# Patient Record
Sex: MALE | Race: WHITE | Employment: FULL TIME | URBAN - NONMETROPOLITAN AREA
[De-identification: names, ages, dates, MRNs, and addresses within clinical notes are randomized per-mention and may not be internally consistent; named-entity substitution may affect disease eponyms.]

---

## 2019-04-15 ENCOUNTER — HOSPITAL ENCOUNTER (EMERGENCY)
Dept: GENERAL RADIOLOGY | Age: 24
Discharge: HOME | End: 2019-04-15
Payer: COMMERCIAL

## 2019-04-15 ENCOUNTER — HOSPITAL ENCOUNTER (EMERGENCY)
Age: 24
Discharge: HOME | End: 2019-04-15
Payer: COMMERCIAL

## 2019-04-15 VITALS
OXYGEN SATURATION: 98 % | SYSTOLIC BLOOD PRESSURE: 138 MMHG | TEMPERATURE: 98.3 F | BODY MASS INDEX: 25.07 KG/M2 | HEART RATE: 72 BPM | DIASTOLIC BLOOD PRESSURE: 83 MMHG | RESPIRATION RATE: 16 BRPM | WEIGHT: 190 LBS

## 2019-04-15 DIAGNOSIS — S52.101A CLOSED FRACTURE OF PROXIMAL END OF RIGHT RADIUS, UNSPECIFIED FRACTURE MORPHOLOGY, INITIAL ENCOUNTER: Primary | ICD-10-CM

## 2019-04-15 PROCEDURE — 73080 X-RAY EXAM OF ELBOW: CPT

## 2019-04-15 PROCEDURE — 99202 OFFICE O/P NEW SF 15 MIN: CPT | Performed by: NURSE PRACTITIONER

## 2019-04-15 PROCEDURE — 29125 APPL SHORT ARM SPLINT STATIC: CPT

## 2019-04-15 PROCEDURE — 99214 OFFICE O/P EST MOD 30 MIN: CPT

## 2019-04-15 PROCEDURE — 2709999900 HC NON-CHARGEABLE SUPPLY

## 2019-04-15 ASSESSMENT — ENCOUNTER SYMPTOMS
VOMITING: 0
NAUSEA: 0
BACK PAIN: 0
COLOR CHANGE: 1
SHORTNESS OF BREATH: 0
DIARRHEA: 0

## 2019-04-15 ASSESSMENT — PAIN SCALES - GENERAL: PAINLEVEL_OUTOF10: 3

## 2019-04-15 ASSESSMENT — PAIN DESCRIPTION - PAIN TYPE: TYPE: ACUTE PAIN

## 2019-04-15 ASSESSMENT — PAIN DESCRIPTION - ORIENTATION: ORIENTATION: RIGHT

## 2019-04-15 ASSESSMENT — PAIN DESCRIPTION - LOCATION: LOCATION: ELBOW

## 2019-04-15 ASSESSMENT — PAIN DESCRIPTION - FREQUENCY: FREQUENCY: INTERMITTENT

## 2019-04-15 ASSESSMENT — PAIN - FUNCTIONAL ASSESSMENT: PAIN_FUNCTIONAL_ASSESSMENT: PREVENTS OR INTERFERES WITH MANY ACTIVE NOT PASSIVE ACTIVITIES

## 2019-04-15 NOTE — ED PROVIDER NOTES
[facility] 36  Urgent Care Encounter       CHIEF COMPLAINT       Chief Complaint   Patient presents with    Joint Swelling     right elbow injury       Nurses Notes reviewed and I agree except as noted in the HPI. HISTORY OF PRESENT ILLNESS   Nadeen Qureshi is a 21 y.o. male who presents to the urgent care for complaints of right elbow swelling. The swelling occurred after injury. This injury occurred on Saturday. The patient was running and fell. When he was falling he struck his arms and hands out to catch himself. Since this time he has been experiencing right elbow pain. He states that Saturday he had severe decreased range of motion in that keep his arm band and a 90° position to provide relief of pain. He denies any numbness or tingling in the affected extremity. HPI    REVIEW OF SYSTEMS     Review of Systems   Constitutional: Positive for activity change. Negative for chills and fever. Respiratory: Negative for shortness of breath. Cardiovascular: Negative for chest pain. Gastrointestinal: Negative for diarrhea, nausea and vomiting. Musculoskeletal: Positive for arthralgias (Right elbow) and joint swelling (Right elbow). Negative for back pain, gait problem and myalgias. Skin: Positive for color change (Right elbow bruising.). Negative for pallor and rash. Allergic/Immunologic: Negative for food allergies. Neurological: Negative for dizziness and headaches. PAST MEDICAL HISTORY         Diagnosis Date    Nicotine dependence        SURGICALHISTORY     Patient  has no past surgical history on file. CURRENT MEDICATIONS       Current Discharge Medication List      CONTINUE these medications which have NOT CHANGED    Details   Ibuprofen (ADVIL PO) Take 2 tablets by mouth      Blood Pressure KIT Check BP's once daily.   Qty: 1 kit, Refills: 0    Associated Diagnoses: Single episode of elevated blood pressure             ALLERGIES     Patient is has No Known recognition program. Efforts were made to edit the dictations but occasionally words are mis-transcribed.)            Martine Veronica, DALTON - CNP  04/15/19 5738

## 2019-04-15 NOTE — ED TRIAGE NOTES
3 days ago outside running on concrete tripped fell face first, right elbow pain, swelling. advil taken. Iced.

## 2020-01-07 ENCOUNTER — HOSPITAL ENCOUNTER (EMERGENCY)
Age: 25
Discharge: HOME | End: 2020-01-07
Payer: COMMERCIAL

## 2020-01-07 VITALS
DIASTOLIC BLOOD PRESSURE: 84 MMHG | HEART RATE: 70 BPM | RESPIRATION RATE: 18 BRPM | WEIGHT: 190 LBS | HEIGHT: 73 IN | SYSTOLIC BLOOD PRESSURE: 152 MMHG | TEMPERATURE: 97.9 F | BODY MASS INDEX: 25.18 KG/M2 | OXYGEN SATURATION: 98 %

## 2020-01-07 PROCEDURE — 90471 IMMUNIZATION ADMIN: CPT | Performed by: NURSE PRACTITIONER

## 2020-01-07 PROCEDURE — 6370000000 HC RX 637 (ALT 250 FOR IP): Performed by: NURSE PRACTITIONER

## 2020-01-07 PROCEDURE — 99282 EMERGENCY DEPT VISIT SF MDM: CPT

## 2020-01-07 PROCEDURE — 90715 TDAP VACCINE 7 YRS/> IM: CPT | Performed by: NURSE PRACTITIONER

## 2020-01-07 PROCEDURE — 6360000002 HC RX W HCPCS: Performed by: NURSE PRACTITIONER

## 2020-01-07 RX ORDER — ERYTHROMYCIN 5 MG/G
OINTMENT OPHTHALMIC
Qty: 1 TUBE | Refills: 0 | Status: SHIPPED | OUTPATIENT
Start: 2020-01-07 | End: 2021-09-20

## 2020-01-07 RX ORDER — ERYTHROMYCIN 5 MG/G
OINTMENT OPHTHALMIC ONCE
Status: COMPLETED | OUTPATIENT
Start: 2020-01-07 | End: 2020-01-07

## 2020-01-07 RX ADMIN — TETANUS TOXOID, REDUCED DIPHTHERIA TOXOID AND ACELLULAR PERTUSSIS VACCINE, ADSORBED 0.5 ML: 5; 2.5; 8; 8; 2.5 SUSPENSION INTRAMUSCULAR at 23:03

## 2020-01-07 RX ADMIN — ERYTHROMYCIN: 5 OINTMENT OPHTHALMIC at 23:02

## 2020-01-07 ASSESSMENT — PAIN DESCRIPTION - FREQUENCY: FREQUENCY: INTERMITTENT

## 2020-01-07 ASSESSMENT — ENCOUNTER SYMPTOMS
SORE THROAT: 0
TROUBLE SWALLOWING: 0
NAUSEA: 0
CHEST TIGHTNESS: 0
ABDOMINAL PAIN: 0
SHORTNESS OF BREATH: 0
RHINORRHEA: 0
WHEEZING: 0
EYE PAIN: 1
VOMITING: 0
COUGH: 0

## 2020-01-07 ASSESSMENT — PAIN DESCRIPTION - PAIN TYPE: TYPE: ACUTE PAIN

## 2020-01-07 ASSESSMENT — PAIN DESCRIPTION - ORIENTATION: ORIENTATION: LEFT

## 2020-01-07 ASSESSMENT — PAIN DESCRIPTION - ONSET: ONSET: SUDDEN

## 2020-01-07 ASSESSMENT — PAIN DESCRIPTION - LOCATION: LOCATION: EYE

## 2020-01-07 ASSESSMENT — PAIN SCALES - GENERAL: PAINLEVEL_OUTOF10: 6

## 2020-01-07 ASSESSMENT — PAIN DESCRIPTION - DESCRIPTORS: DESCRIPTORS: SHARP

## 2020-01-08 NOTE — ED PROVIDER NOTES
[facility]  eMERGENCY dEPARTMENT eNCOUnter          CHIEF COMPLAINT       Chief Complaint   Patient presents with    Foreign Body     left eye       Nurses Notes reviewed and I agree except as noted in the HPI. HISTORY OF PRESENT ILLNESS    Torsten Calderon is a 25 y.o. male who presents to the Emergency Department for the evaluation of foreign body in left eye. The patient stated he was outside smoking a cigarette during his lunch break and reported that he felt something fly into his left eye around 6 PM. The patient stated he applied eye drops in his eye, and remained at work. However, the patient stated the eye still felt irritated even after the administration of eye drops. Per the nurse's note, the nurse at work advised the patient to be visit the ED due to observing something on the middle of the left eye. The patient rated his eye pain a 6 out of 10 in severity. The patient has medical history of nicotine dependence. The patient has no further acute complaints at this time. The HPI was provided by the patient. REVIEW OF SYSTEMS     Review of Systems   Constitutional: Negative for fever. HENT: Negative for congestion, rhinorrhea, sore throat and trouble swallowing. Eyes: Positive for pain (left eye). Respiratory: Negative for cough, chest tightness, shortness of breath and wheezing. Cardiovascular: Negative for chest pain and palpitations. Gastrointestinal: Negative for abdominal pain, nausea and vomiting. Musculoskeletal: Negative for arthralgias and myalgias. Neurological: Negative for headaches. Hematological: Does not bruise/bleed easily. PAST MEDICAL HISTORY    has a past medical history of Nicotine dependence. SURGICAL HISTORY      has no past surgical history on file. CURRENT MEDICATIONS       Previous Medications    BLOOD PRESSURE KIT    Check BP's once daily.     IBUPROFEN (ADVIL PO)    Take 2 tablets by mouth       ALLERGIES     has No Known Allergies. FAMILY HISTORY     He indicated that his mother is alive. He indicated that his father is alive. He indicated that his paternal grandfather is . He indicated that the status of his neg hx is unknown.   family history includes Cancer in his paternal grandfather. SOCIAL HISTORY      reports that he has been smoking cigarettes. He has a 1.00 pack-year smoking history. He has never used smokeless tobacco. He reports current alcohol use. He reports that he does not use drugs. PHYSICAL EXAM     INITIAL VITALS:  height is 6' 1\" (1.854 m) and weight is 190 lb (86.2 kg). His temperature is 97.9 °F (36.6 °C). His blood pressure is 152/84 (abnormal) and his pulse is 70. His respiration is 18 and oxygen saturation is 98%. Physical Exam  Vitals signs and nursing note reviewed. Constitutional:       Appearance: He is well-developed. He is not diaphoretic. HENT:      Head: Normocephalic and atraumatic. Right Ear: External ear normal.      Left Ear: External ear normal.   Eyes:      General: No scleral icterus. Right eye: No discharge. Left eye: No foreign body or discharge. Conjunctiva/sclera: Conjunctivae normal.      Pupils:      Left eye: Corneal abrasion and fluorescein uptake present. Slit lamp exam:     Left eye: No foreign body. Comments: Corneal abrasion is at the 6 o'clock position. IOP 16. Neck:      Musculoskeletal: Normal range of motion and neck supple. Vascular: No JVD. Pulmonary:      Effort: Pulmonary effort is normal. No respiratory distress. Breath sounds: No stridor. Abdominal:      General: There is no distension. Palpations: Abdomen is soft. Musculoskeletal: Normal range of motion. Skin:     General: Skin is warm and dry. Findings: No erythema. Neurological:      Mental Status: He is alert and oriented to person, place, and time. Motor: No abnormal muscle tone.    Psychiatric:         Behavior: Behavior normal.          DIFFERENTIAL DIAGNOSIS:   Including but not limited to: foreign body, corneal abrasion, foreign body sensation     DIAGNOSTIC RESULTS       RADIOLOGY: non-plainfilm images(s) such as CT, Ultrasound and MRI are read by the radiologist.    No orders to display       LABS:     Labs Reviewed - No data to display    EMERGENCY DEPARTMENT COURSE:   Vitals:    Vitals:    01/07/20 2224   BP: (!) 152/84   Pulse: 70   Resp: 18   Temp: 97.9 °F (36.6 °C)   SpO2: 98%   Weight: 190 lb (86.2 kg)   Height: 6' 1\" (1.854 m)       10:41 PM: The patient was seen and evaluated. MDM:  Pertinent Labs & Imaging studies reviewed. (See chart for details)    The patient was seen and evaluated within the ED today with foreign body in the left eye. Within the department, I observed the patient's vital signs to be within acceptable range. On exam, I appreciated findings as documented in the physical exam.  Radiological studies and laboratory work were not indicated. Within the department, the patient was treated with Boostrix to update his tetanus status. I observed the patient's condition to remain stable during the duration of the stay and I explained my proposed course of treatment to the patient, who was amenable to my decision. They were discharged home in stable condition with a prescription for erythromycin ointment he is instructed to follow-up tomorrow with occupational health and ophthalmology, and the patient will return to the ED if the symptoms become more severe in nature or otherwise change    I have given the patient strict written and verbal instructions about care at home, follow-up, and signs and symptoms of worsening of condition and they did verbalize understanding. CRITICAL CARE:   none    CONSULTS:  none    PROCEDURES:  none    FINAL IMPRESSION      1.  Abrasion of left cornea, initial encounter          DISPOSITION/PLAN   Discharge      PATIENT REFERRED TO:  Gertrudis Lew MD  [address] [facility] [address]  [phone]    Schedule an appointment as soon as possible for a visit in 1 day      [address] [place] [address]  [phone]  Go in 1 day  10:30      DISCHARGE MEDICATIONS:  New Prescriptions    ERYTHROMYCIN (ROMYCIN) 5 MG/GM OPHTHALMIC OINTMENT    Every 4 hours while awake for 7 days       (Please note that portions of this note were completed with a voice recognition program.  Efforts were made to edit the dictations but occasionally words are mis-transcribed.)    The patient was given an opportunity to see the Emergency Attending. The patient voiced understanding that I was a Mid-LevelProvider and was in agreement with being seen independently by myself. Scribe:  Deepali Rivera 1/7/20 10:41 PM Scribing for and in the presence of Bassem Cline CNP. Signed by: Ilana Fields, 01/07/20 11:17 PM    Provider:  I personally performed the services described in the documentation, reviewed and edited the documentation which was dictated to the scribe in my presence, and it accurately records my words and actions.     Bassem Cline CNP 1/7/20 11:17 PM      DALTON Moreno CNP  01/07/20 6055

## 2021-09-20 ENCOUNTER — HOSPITAL ENCOUNTER (EMERGENCY)
Age: 26
Discharge: HOME | End: 2021-09-20
Payer: COMMERCIAL

## 2021-09-20 VITALS
DIASTOLIC BLOOD PRESSURE: 85 MMHG | HEART RATE: 82 BPM | HEIGHT: 74 IN | TEMPERATURE: 97.6 F | OXYGEN SATURATION: 97 % | WEIGHT: 185 LBS | RESPIRATION RATE: 16 BRPM | BODY MASS INDEX: 23.74 KG/M2 | SYSTOLIC BLOOD PRESSURE: 142 MMHG

## 2021-09-20 DIAGNOSIS — Z20.822 SUSPECTED 2019 NOVEL CORONAVIRUS INFECTION: Primary | ICD-10-CM

## 2021-09-20 PROCEDURE — 99214 OFFICE O/P EST MOD 30 MIN: CPT | Performed by: NURSE PRACTITIONER

## 2021-09-20 PROCEDURE — 99213 OFFICE O/P EST LOW 20 MIN: CPT

## 2021-09-20 PROCEDURE — 87636 SARSCOV2 & INF A&B AMP PRB: CPT

## 2021-09-20 ASSESSMENT — ENCOUNTER SYMPTOMS
BACK PAIN: 0
DIARRHEA: 0
EYE REDNESS: 0
RHINORRHEA: 0
CONSTIPATION: 0
WHEEZING: 0
COUGH: 1
NAUSEA: 0
SORE THROAT: 0
TROUBLE SWALLOWING: 0
VOMITING: 0
SHORTNESS OF BREATH: 0
EYE PAIN: 0
EYE DISCHARGE: 0
ALLERGIC/IMMUNOLOGIC NEGATIVE: 1
ABDOMINAL PAIN: 0

## 2021-09-20 ASSESSMENT — PAIN DESCRIPTION - LOCATION: LOCATION: EAR

## 2021-09-20 ASSESSMENT — PAIN SCALES - GENERAL: PAINLEVEL_OUTOF10: 7

## 2021-09-20 ASSESSMENT — PAIN DESCRIPTION - ORIENTATION: ORIENTATION: RIGHT;LEFT

## 2021-09-20 ASSESSMENT — PAIN DESCRIPTION - PAIN TYPE: TYPE: ACUTE PAIN

## 2021-09-20 ASSESSMENT — PAIN DESCRIPTION - DESCRIPTORS: DESCRIPTORS: PRESSURE

## 2021-09-20 NOTE — ED PROVIDER NOTES
[facility] 36  Urgent Care Encounter      CHIEF COMPLAINT       Chief Complaint   Patient presents with    Chills    Cough       Nurses Notes reviewed and I agree except as noted in the HPI. HISTORY OF PRESENT ILLNESS   Chauncey Almazan is a 32 y.o. male who presents with development of chills, cough, congestion, runny nose and fatigue for the last couple of days. He has had here by his employer today for COVID-19 testing. Denies nausea vomiting but does have a bit of diarrhea, no abdominal pain or severe headache. Non-smoker with no history of asthma. Patient specifically requests PCR testing. REVIEW OF SYSTEMS     Review of Systems   Constitutional: Positive for chills and fatigue. Negative for activity change and fever. HENT: Positive for congestion. Negative for ear pain, rhinorrhea, sore throat and trouble swallowing. Eyes: Negative for pain, discharge and redness. Respiratory: Positive for cough. Negative for shortness of breath and wheezing. Cardiovascular: Negative. Gastrointestinal: Negative for abdominal pain, constipation, diarrhea, nausea and vomiting. Endocrine: Negative. Genitourinary: Negative for dysuria, frequency and urgency. Musculoskeletal: Negative for arthralgias, back pain and myalgias. Skin: Negative for rash. Allergic/Immunologic: Negative. Neurological: Negative for dizziness, tremors, weakness and headaches. Hematological: Negative. Psychiatric/Behavioral: Negative for dysphoric mood and sleep disturbance. The patient is not nervous/anxious. PAST MEDICAL HISTORY         Diagnosis Date    Nicotine dependence        SURGICAL HISTORY     Patient  has no past surgical history on file. CURRENT MEDICATIONS       Previous Medications    BLOOD PRESSURE KIT    Check BP's once daily. IBUPROFEN (ADVIL PO)    Take 2 tablets by mouth       ALLERGIES     Patient is has No Known Allergies.     FAMILY HISTORY     Patient'sfamily history includes Cancer in his paternal grandfather. SOCIAL HISTORY     Patient  reports that he has been smoking cigarettes. He has a 1.00 pack-year smoking history. He has never used smokeless tobacco. He reports current alcohol use. He reports that he does not use drugs. PHYSICAL EXAM     ED TRIAGE VITALS  BP: (!) 142/85, Temp: 97.6 °F (36.4 °C), Pulse: 82, Resp: 16, SpO2: 97 %  Physical Exam  Constitutional:       General: He is not in acute distress. Appearance: He is well-developed. He is ill-appearing. He is not diaphoretic. HENT:      Right Ear: External ear normal.      Left Ear: External ear normal.      Nose: Congestion and rhinorrhea present. Mouth/Throat:      Mouth: Mucous membranes are moist.      Pharynx: Posterior oropharyngeal erythema present. Eyes:      General:         Right eye: No discharge. Left eye: No discharge. Conjunctiva/sclera: Conjunctivae normal.      Pupils: Pupils are equal, round, and reactive to light. Neck:      Vascular: No JVD. Cardiovascular:      Rate and Rhythm: Normal rate and regular rhythm. Heart sounds: Normal heart sounds. No murmur heard. Pulmonary:      Effort: Pulmonary effort is normal. No respiratory distress. Breath sounds: Normal breath sounds. Musculoskeletal:         General: No tenderness or deformity. Normal range of motion. Cervical back: Normal range of motion. Skin:     General: Skin is warm and dry. Capillary Refill: Capillary refill takes less than 2 seconds. Coloration: Skin is not pale. Findings: No erythema or rash. Neurological:      Mental Status: He is alert and oriented to person, place, and time. Coordination: Coordination normal.   Psychiatric:         Behavior: Behavior normal.         Thought Content: Thought content normal.         Judgment: Judgment normal.         DIAGNOSTIC RESULTS   Labs: No results found for this visit on 09/20/21.     IMAGING:    URGENT CARE COURSE:     Vitals:    09/20/21 1619   BP: (!) 142/85   Pulse: 82   Resp: 16   Temp: 97.6 °F (36.4 °C)   SpO2: 97%   Weight: 185 lb (83.9 kg)   Height: 6' 2\" (1.88 m)       Medications - No data to display  PROCEDURES:  None  FINAL IMPRESSION      1. Suspected 2019 novel coronavirus infection        DISPOSITION/PLAN   DISPOSITION Decision To Discharge 09/20/2021 04:33:35 PM    COVID-19 test pending.     PATIENT REFERRED TO:  DALTON Bruner CNP  [address]  [phone]      As needed    DISCHARGE MEDICATIONS:  New Prescriptions    No medications on file     Current Discharge Medication List          DALTON Khan CNP, APRN - CNP  09/20/21 1114

## 2021-09-21 ENCOUNTER — CARE COORDINATION (OUTPATIENT)
Dept: CARE COORDINATION | Age: 26
End: 2021-09-21

## 2021-09-21 LAB
INFLUENZA A: NOT DETECTED
INFLUENZA B: NOT DETECTED
SARS-COV-2 RNA, RT PCR: DETECTED

## 2021-09-21 NOTE — CARE COORDINATION
Patient contacted regarding recent visit for viral symptoms. Call within 2 business days of discharge: Yes     contacted the patient by telephone to perform follow-up call. Verified name and  with patient as identifiers. Provided introduction to self, and reason for call due to viral symptoms of infection and/or exposure to COVID-19. Discussed COVID-19 related testing which was available at this time. Test results were positive. Patient informed of results, if available? Yes. Patient presented to emergency department/flu clinic with complaints of viral symptoms/exposure to COVID. Patient reports symptoms are improving. Due to no new or worsening symptoms the RN CTN/ACJENNYFER was not notified for escalation. This author reviewed discharge instructions, medical action plan and red flags such as increased shortness of breath, increasing fever, worsening cough or chest pain with patient who verbalized understanding. Discussed exposure protocols and quarantine with CDC Guidelines What To Do If You Are Sick    Patient was given an opportunity for questions and concerns. The patient agrees to contact their healthcare provider for questions related to their healthcare. Author provided contact information for future reference. COVID positive. Patient states he is feeling better. States his ear feels clogged. Planning to try OTC ear drops. Mother did bring patients vitamins and zinc.  Denies fever or SOB. Encouraged rest, fluids and isolation. Patient was encouraged to f/u with local health department and contact information was provided. Covid-19 education was reviewed with patient, and patient verbalized understanding. Patient was reminded to call covid-19 hotline number with any new symptoms or questions/concerns. Patient verbalized understanding and hotline number provided. Patient denied any other questions, concerns, or needs.

## 2022-02-21 ENCOUNTER — HOSPITAL ENCOUNTER (EMERGENCY)
Age: 27
Discharge: HOME | End: 2022-02-21
Attending: FAMILY MEDICINE
Payer: COMMERCIAL

## 2022-02-21 ENCOUNTER — TELEPHONE (OUTPATIENT)
Dept: UROLOGY | Age: 27
End: 2022-02-21

## 2022-02-21 ENCOUNTER — APPOINTMENT (OUTPATIENT)
Dept: ULTRASOUND IMAGING | Age: 27
End: 2022-02-21
Payer: COMMERCIAL

## 2022-02-21 ENCOUNTER — APPOINTMENT (OUTPATIENT)
Dept: CT IMAGING | Age: 27
End: 2022-02-21
Payer: COMMERCIAL

## 2022-02-21 VITALS
TEMPERATURE: 98.2 F | HEART RATE: 99 BPM | BODY MASS INDEX: 26.95 KG/M2 | DIASTOLIC BLOOD PRESSURE: 90 MMHG | SYSTOLIC BLOOD PRESSURE: 171 MMHG | OXYGEN SATURATION: 96 % | RESPIRATION RATE: 16 BRPM | HEIGHT: 74 IN | WEIGHT: 210 LBS

## 2022-02-21 DIAGNOSIS — N50.89 TESTICULAR MASS: Primary | ICD-10-CM

## 2022-02-21 LAB
ALBUMIN SERPL-MCNC: 4.7 G/DL (ref 3.5–5.1)
ALP BLD-CCNC: 119 U/L (ref 38–126)
ALT SERPL-CCNC: 20 U/L (ref 11–66)
ANION GAP SERPL CALCULATED.3IONS-SCNC: 13 MEQ/L (ref 8–16)
AST SERPL-CCNC: 19 U/L (ref 5–40)
BASOPHILS # BLD: 0.3 %
BASOPHILS ABSOLUTE: 0 THOU/MM3 (ref 0–0.1)
BILIRUB SERPL-MCNC: 0.5 MG/DL (ref 0.3–1.2)
BILIRUBIN URINE: NEGATIVE
BLOOD, URINE: NEGATIVE
BUN BLDV-MCNC: 10 MG/DL (ref 7–22)
CALCIUM SERPL-MCNC: 9.6 MG/DL (ref 8.5–10.5)
CHARACTER, URINE: CLEAR
CHLAMYDIA TRACHOMATIS BY RT-PCR: NOT DETECTED
CHLORIDE BLD-SCNC: 102 MEQ/L (ref 98–111)
CO2: 23 MEQ/L (ref 23–33)
COLOR: YELLOW
CREAT SERPL-MCNC: 0.8 MG/DL (ref 0.4–1.2)
CT/NG SOURCE: NORMAL
EOSINOPHIL # BLD: 0.2 %
EOSINOPHILS ABSOLUTE: 0 THOU/MM3 (ref 0–0.4)
ERYTHROCYTE [DISTWIDTH] IN BLOOD BY AUTOMATED COUNT: 11.7 % (ref 11.5–14.5)
ERYTHROCYTE [DISTWIDTH] IN BLOOD BY AUTOMATED COUNT: 39.6 FL (ref 35–45)
GFR SERPL CREATININE-BSD FRML MDRD: > 90 ML/MIN/1.73M2
GLUCOSE BLD-MCNC: 90 MG/DL (ref 70–108)
GLUCOSE URINE: NEGATIVE MG/DL
HCT VFR BLD CALC: 49.3 % (ref 42–52)
HEMOGLOBIN: 16.7 GM/DL (ref 14–18)
IMMATURE GRANS (ABS): 0.03 THOU/MM3 (ref 0–0.07)
IMMATURE GRANULOCYTES: 0.3 %
KETONES, URINE: ABNORMAL
LD: 445 U/L (ref 100–190)
LEUKOCYTE ESTERASE, URINE: NEGATIVE
LYMPHOCYTES # BLD: 17.6 %
LYMPHOCYTES ABSOLUTE: 1.7 THOU/MM3 (ref 1–4.8)
MCH RBC QN AUTO: 31.3 PG (ref 26–33)
MCHC RBC AUTO-ENTMCNC: 33.9 GM/DL (ref 32.2–35.5)
MCV RBC AUTO: 92.5 FL (ref 80–94)
MONOCYTES # BLD: 8 %
MONOCYTES ABSOLUTE: 0.8 THOU/MM3 (ref 0.4–1.3)
NEISSERIA GONORRHOEAE BY RT-PCR: NOT DETECTED
NITRITE, URINE: NEGATIVE
NUCLEATED RED BLOOD CELLS: 0 /100 WBC
OSMOLALITY CALCULATION: 274.3 MOSMOL/KG (ref 275–300)
PH UA: 5 (ref 5–9)
PLATELET # BLD: 197 THOU/MM3 (ref 130–400)
PMV BLD AUTO: 10.8 FL (ref 9.4–12.4)
POTASSIUM REFLEX MAGNESIUM: 4.5 MEQ/L (ref 3.5–5.2)
PREGNANCY, SERUM: NEGATIVE
PROTEIN UA: NEGATIVE
RBC # BLD: 5.33 MILL/MM3 (ref 4.7–6.1)
SEG NEUTROPHILS: 73.6 %
SEGMENTED NEUTROPHILS ABSOLUTE COUNT: 7.1 THOU/MM3 (ref 1.8–7.7)
SODIUM BLD-SCNC: 138 MEQ/L (ref 135–145)
SPECIFIC GRAVITY, URINE: 1.03 (ref 1–1.03)
TOTAL PROTEIN: 7.6 G/DL (ref 6.1–8)
UROBILINOGEN, URINE: 0.2 EU/DL (ref 0–1)
WBC # BLD: 9.6 THOU/MM3 (ref 4.8–10.8)

## 2022-02-21 PROCEDURE — 76870 US EXAM SCROTUM: CPT

## 2022-02-21 PROCEDURE — 99282 EMERGENCY DEPT VISIT SF MDM: CPT

## 2022-02-21 PROCEDURE — 87591 N.GONORRHOEAE DNA AMP PROB: CPT

## 2022-02-21 PROCEDURE — 80053 COMPREHEN METABOLIC PANEL: CPT

## 2022-02-21 PROCEDURE — 84703 CHORIONIC GONADOTROPIN ASSAY: CPT

## 2022-02-21 PROCEDURE — 36415 COLL VENOUS BLD VENIPUNCTURE: CPT

## 2022-02-21 PROCEDURE — 74177 CT ABD & PELVIS W/CONTRAST: CPT

## 2022-02-21 PROCEDURE — 83615 LACTATE (LD) (LDH) ENZYME: CPT

## 2022-02-21 PROCEDURE — 81003 URINALYSIS AUTO W/O SCOPE: CPT

## 2022-02-21 PROCEDURE — 87491 CHLMYD TRACH DNA AMP PROBE: CPT

## 2022-02-21 PROCEDURE — 85025 COMPLETE CBC W/AUTO DIFF WBC: CPT

## 2022-02-21 PROCEDURE — 6360000004 HC RX CONTRAST MEDICATION: Performed by: UROLOGY

## 2022-02-21 PROCEDURE — 82105 ALPHA-FETOPROTEIN SERUM: CPT

## 2022-02-21 PROCEDURE — 99282 EMERGENCY DEPT VISIT SF MDM: CPT | Performed by: UROLOGY

## 2022-02-21 RX ADMIN — IOPAMIDOL 80 ML: 755 INJECTION, SOLUTION INTRAVENOUS at 16:13

## 2022-02-21 ASSESSMENT — ENCOUNTER SYMPTOMS
EYE DISCHARGE: 0
FACIAL SWELLING: 0
SINUS PRESSURE: 0
EYE REDNESS: 0
BLOOD IN STOOL: 0
PHOTOPHOBIA: 0
ABDOMINAL PAIN: 0
CHOKING: 0
RECTAL PAIN: 0
SHORTNESS OF BREATH: 0
ANAL BLEEDING: 0
CHEST TIGHTNESS: 0
COUGH: 0
APNEA: 0
SINUS PAIN: 0
EYE PAIN: 0

## 2022-02-21 NOTE — ED TRIAGE NOTES
Pt presents to ED with R testicle swelling and pain. Pt states that the swelling started about 2 weeks ago and has gradually gotten worse over time. Pt states that he was unable to move well due to pain on Friday, 2/18. Pt states there was no known injury to the area. Pt states no pain when sitting but pain appears while moving. Pts vitals were taken and are stable, BP was 171/90.

## 2022-02-21 NOTE — ED PROVIDER NOTES
[facility] ENCOUNTER          Pt Name: Burnell Rinne  MRN: [id number]  [place] 1995  Date of evaluation: 2/21/2022  Treating Resident Physician: Emmett Hart MD  Supervising Physician: Sonali Soares MD    [address] [place] [address]       Chief Complaint   Patient presents with    Groin Swelling     History obtained from the patient. HISTORY OF PRESENT ILLNESS    HPI  Burnell Rinne is a 32 y.o. male who presents to the emergency department for evaluation of groin swelling. Patient seen to the ED for right-sided scrotal/testicular swelling. Patient has been having symptoms for 2 weeks. States that the pain is intermittent and comes and goes however on Friday night the pain was excruciating while patient was at work and standing for long periods of time. Patient denies any trauma to the area. Patient admits to having sexual intercourse approximately 3 days before symptom onset. Endorses that he used protection with a condom. Patient has had past STD infection with chlamydia years prior. Currently patient denies having any pain due to being in a sitting position. Pain is exacerbated by standing and walking around. The patient has no other acute complaints at this time. REVIEW OF SYSTEMS   Review of Systems   Constitutional: Negative for activity change, chills, diaphoresis and fatigue. HENT: Negative for congestion, ear discharge, ear pain, facial swelling, hearing loss, sinus pressure and sinus pain. Eyes: Negative for photophobia, pain, discharge and redness. Respiratory: Negative for apnea, cough, choking, chest tightness and shortness of breath. Cardiovascular: Negative for chest pain and leg swelling. Gastrointestinal: Negative for abdominal pain, anal bleeding, blood in stool and rectal pain. Endocrine: Negative for polydipsia, polyphagia and polyuria. Genitourinary: Positive for scrotal swelling and testicular pain. Negative for dysuria, flank pain, genital sores, hematuria, penile discharge, penile pain and penile swelling. Musculoskeletal: Negative for gait problem, joint swelling, myalgias, neck pain and neck stiffness. Skin: Negative for pallor, rash and wound. Neurological: Negative for tremors, seizures, syncope, facial asymmetry and headaches. Hematological: Negative for adenopathy. Psychiatric/Behavioral: Negative for agitation, behavioral problems, hallucinations, self-injury and suicidal ideas. PAST MEDICAL AND SURGICAL HISTORY     Past Medical History:   Diagnosis Date    Nicotine dependence      History reviewed. No pertinent surgical history. MEDICATIONS   No current facility-administered medications for this encounter. No current outpatient medications on file. SOCIAL HISTORY     Social History     Social History Narrative    Not on file     Social History     Tobacco Use    Smoking status: Former Smoker     Packs/day: 0.50     Years: 2.00     Pack years: 1.00     Types: Cigarettes     Start date: 2/21/2021    Smokeless tobacco: Never Used   Vaping Use    Vaping Use: Never used   Substance Use Topics    Alcohol use: Yes     Alcohol/week: 0.0 standard drinks     Comment: occasional    Drug use: No       ALLERGIES     Allergies   Allergen Reactions    Cefprozil        FAMILY HISTORY     Family History   Problem Relation Age of Onset    Cancer Paternal Grandfather         lung    Hypertension Neg Hx     Colon Cancer Neg Hx     Prostate Cancer Neg Hx        PREVIOUS RECORDS   Previous records reviewed: Patient last seen in the emergency room on 9/20/2021 for suspected COVID-19. PHYSICAL EXAM     ED Triage Vitals [02/21/22 1152]   BP Temp Temp Source Pulse Resp SpO2 Height Weight   (!) 171/90 98.2 °F (36.8 °C) Oral 99 16 96 % 6' 2\" (1.88 m) 210 lb (95.3 kg)     Initial vital signs and nursing assessment reviewed and abnormal from htn. Body mass index is 26.96 kg/m².  Pulsoximetry is normal per my interpretation. Additional Vital Signs:  Vitals:    02/21/22 1152   BP: (!) 171/90   Pulse: 99   Resp: 16   Temp: 98.2 °F (36.8 °C)   SpO2: 96%       Physical Exam  Exam conducted with a chaperone present. Constitutional:       Appearance: He is not diaphoretic. HENT:      Head: Normocephalic and atraumatic. Right Ear: Tympanic membrane and external ear normal.      Left Ear: Tympanic membrane and external ear normal.      Nose: Nose normal. No congestion or rhinorrhea. Mouth/Throat:      Pharynx: No oropharyngeal exudate or posterior oropharyngeal erythema. Eyes:      General: No scleral icterus. Right eye: No discharge. Left eye: No discharge. Extraocular Movements: Extraocular movements intact. Conjunctiva/sclera: Conjunctivae normal.      Pupils: Pupils are equal, round, and reactive to light. Neck:      Vascular: No carotid bruit. Cardiovascular:      Rate and Rhythm: Normal rate and regular rhythm. Pulses: Normal pulses. Heart sounds: Normal heart sounds. No murmur heard. No friction rub. No gallop. Pulmonary:      Effort: Pulmonary effort is normal. No respiratory distress. Breath sounds: Normal breath sounds. No stridor. No wheezing. Chest:      Chest wall: No tenderness. Abdominal:      General: Abdomen is flat. Bowel sounds are normal. There is no distension. Palpations: Abdomen is soft. There is no mass. Tenderness: There is no abdominal tenderness. There is no guarding or rebound. Hernia: No hernia is present. Genitourinary:     Pubic Area: No rash. Testes:         Right: Mass, tenderness and swelling present. Cremasteric reflex is absent. Left: Mass, tenderness or swelling not present. Cremasteric reflex is present. Musculoskeletal:         General: No swelling, tenderness, deformity or signs of injury. Normal range of motion.       Cervical back: Normal range of motion and neck supple. No rigidity or tenderness. Right lower leg: No edema. Left lower leg: No edema. Lymphadenopathy:      Cervical: No cervical adenopathy. Skin:     General: Skin is warm and dry. Capillary Refill: Capillary refill takes less than 2 seconds. Coloration: Skin is not jaundiced. Findings: No bruising, erythema, lesion or rash. Neurological:      General: No focal deficit present. Mental Status: He is alert and oriented to person, place, and time. Motor: No weakness. Psychiatric:         Mood and Affect: Mood normal.         Behavior: Behavior normal.         Thought Content: Thought content normal.       MEDICAL DECISION MAKING   Initial Assessment:   3 26-year-old male chief complaint right-sided scrotal/testicular swelling. Denies trauma to the area. Had sexual intercourse before symptom onset. Differential diagnoses include but is not limited to epididymitis, torsion, scrotal mass/malignancy    Plan:    UA, STD panel   Ultrasound   Consult to urology   AFP,HCG, LDH   CT abd pelvis w/con   D/c with f/u to urology clinic    ED RESULTS   Laboratory results:  Labs Reviewed   URINALYSIS WITH REFLEX TO CULTURE - Abnormal; Notable for the following components:       Result Value    Ketones, Urine TRACE (*)     All other components within normal limits   C. TRACHOMATIS / N. GONORRHOEAE, DNA   AFP TUMOR MARKER   HCG, SERUM, QUALITATIVE   LACTATE DEHYDROGENASE       Radiologic studies results:  US SCROTUM AND TESTICLES   Final Result      1. 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 2. Associated moderate hydrocele on the right. 3. 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 **This report has been created using voice recognition software. It may contain minor errors which are inherent in voice recognition technology. **      Final report electronically signed by Dr. Malachi Corrales MD on 2/21/2022 2:00 PM

## 2022-02-21 NOTE — TELEPHONE ENCOUNTER
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

## 2022-02-21 NOTE — CONSULTS
[address] [facility],6Th Floor EMERGENCY DEPT  [address] [facility] [address]  Dept: [phone]  Loc: [phone]  Visit Date: 2/21/2022    Urology Consult Note    Reason for Consult:  r testicular swelling, mass  Requesting Physician:  ivan    History Obtained From:  Patient, EMR    Chief Complaint: right testicular swelling    HISTORY OF PRESENT ILLNESS:                The patient is a 32 y.o. male with significant past medical history of see below who presented with scrotal swelling for 2 weeks. Scrotal US shows:    Impression       1. 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 2. Associated moderate hydrocele on the right. 3. 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 He reports pain when ambulating, none at rest    Past Medical History:        Diagnosis Date    Nicotine dependence      Past Surgical History:    History reviewed. No pertinent surgical history. Allergies:  Cefprozil  Social History:  Social History     Socioeconomic History    Marital status: Single     Spouse name: Not on file    Number of children: Not on file    Years of education: Not on file    Highest education level: Not on file   Occupational History    Not on file   Tobacco Use    Smoking status: Former Smoker     Packs/day: 0.50     Years: 2.00     Pack years: 1.00     Types: Cigarettes     Start date: 2/21/2021    Smokeless tobacco: Never Used   Vaping Use    Vaping Use: Never used   Substance and Sexual Activity    Alcohol use:  Yes     Alcohol/week: 0.0 standard drinks     Comment: occasional    Drug use: No    Sexual activity: Yes     Partners: Female   Other Topics Concern    Not on file   Social History Narrative    Not on file     Social Determinants of Health     Financial Resource Strain:     Difficulty of Paying Living Expenses: Not on file   Food Insecurity:     Worried About Running Out of Food in the Last Year: Not on file   Ana Ran Out of Food in the Last Year: Not on file   Transportation Needs:     Lack of Transportation (Medical): Not on file    Lack of Transportation (Non-Medical): Not on file   Physical Activity:     Days of Exercise per Week: Not on file    Minutes of Exercise per Session: Not on file   Stress:     Feeling of Stress : Not on file   Social Connections:     Frequency of Communication with Friends and Family: Not on file    Frequency of Social Gatherings with Friends and Family: Not on file    Attends Qu[employer] Services: Not on file    Active Member of [address] [place] [address] [employer] or Organizations: Not on file    Attends Club or Organization Meetings: Not on file    Marital Status: Not on file   Intimate Partner Violence:     Fear of Current or Ex-Partner: Not on file    Emotionally Abused: Not on file    Physically Abused: Not on file    Sexually Abused: Not on file   Housing Stability:     Unable to Pay for Housing in the Last Year: Not on file    Number of [place] in the Last Year: Not on file    Unstable Housing in the Last Year: Not on file     Family History:       Problem Relation Age of Onset    Cancer Paternal Grandfather         lung    Hypertension Neg Hx     Colon Cancer Neg Hx     Prostate Cancer Neg Hx        ROS:  Constitutional: Negative for chills, fatigue, fever, or weight loss. Eyes: Denies reported visual changes. ENT: Denies headache, difficulty swallowing, earache, and nosebleeds. Cardiovascular: Negative for chest pain, palpitations, tachycardia or edema. Respiratory: Denies cough or SOB. GI:The patient denies abdominal or flank pain, anorexia, nausea or vomiting. : see HPI. Musculoskeletal: Patient denies low back pain or painful or reduced range of ROM. Neurological: The patient denies any symptoms of neurological impairment or TIA. Psychiatric: Denies anxiety or depression. Skin: Denies rash or lesions. All remaining ROS negative.     PHYSICAL EXAM:  VITALS:  BP (!) 171/90 Pulse 99   Temp 98.2 °F (36.8 °C) (Oral)   Resp 16   Ht 6' 2\" (1.88 m)   Wt 210 lb (95.3 kg)   SpO2 96%   BMI 26.96 kg/m² . Nursing note and vitals reviewed. Constitutional: Alert and oriented times x3, no acute distress, and cooperative to examination with appropriate mood and affect. HEENT:   Head:         Normocephalic and atraumatic. Mouth/Throat:          Mucous membranes are normal.   Eyes:         EOM are normal. No scleral icterus. Nose:    The external appearance of the nose is normal  Ears: The ears appear normal to external inspection. Cardiovascular:       Normal rate, regular rhythm. Pulmonary/Chest:  Normal respiratory rate and rhthym. No use of accessory muscles. Lungs clear bilaterally. Abdominal:          Soft. No tenderness. Active bowel sounds             Genitalia:    Normal circumcised penis  Urethral meatus is normal in size and location  Right scrotal swelling, some pain on palpation  Musculoskeletal:    Normal range of motion. He exhibits no edema or tenderness of lower extremities. Extremities:    No cyanosis, clubbing, or edema present. Neurological:    Alert and oriented.      DATA:  CBC:   Lab Results   Component Value Date    WBC 11.4 07/06/2013    RBC 5.13 07/06/2013    HGB 15.6 07/06/2013    HCT 45.0 07/06/2013    MCV 87.8 07/06/2013    MCH 30.4 07/06/2013    MCHC 34.6 07/06/2013    RDW 12.3 07/06/2013     07/06/2013    MPV 9.1 07/06/2013     BMP:    Lab Results   Component Value Date     07/06/2013    K 3.9 07/06/2013     07/06/2013    CO2 30 07/06/2013    BUN 9 07/06/2013    CREATININE 1.0 07/06/2013    CALCIUM 9.7 07/06/2013    GLUCOSE 108 07/06/2013     BUN/Creatinine:    Lab Results   Component Value Date    BUN 9 07/06/2013    CREATININE 1.0 07/06/2013     Magnesium:  No results found for: MG  Phosphorus:  No results found for: PHOS  PT/INR:  No results found for: PROTIME, INR  U/A:    Lab Results   Component Value Date    COLORU YELLOW 02/21/2022    PHUR 5.0 02/21/2022    WBCUA 0-2 07/06/2013    RBCUA 0-2 07/06/2013    YEAST NONE SEEN 07/06/2013    BACTERIA NONE 07/06/2013    LEUKOCYTESUR NEGATIVE 02/21/2022    UROBILINOGEN 0.2 02/21/2022    BILIRUBINUR NEGATIVE 02/21/2022    BLOODU NEGATIVE 02/21/2022    GLUCOSEU NEGATIVE 02/21/2022       Imaging: The patient has had a Scrotal Ultrasound which I have independently reviewed along with its accompanying report. The study demonstrates   Narrative   PROCEDURE: US SCROTUM AND TESTICLES       CLINICAL INFORMATION: r/o torsion . Right scrotal swelling and tenderness for 2 weeks.       COMPARISON: No prior study.       TECHNIQUE: Grayscale and color Doppler sonographic imaging of the scrotum was performed in the longitudinal and transverse planes.       FINDINGS:    There is a macrolobulated hypoechoic hypervascular mass within the right testicle measuring 6.5 x 5.7 x 3.7 cm. There is a rim of normal-appearing testicular parenchyma surrounding the mass. The left testicle is normal in echotexture. There are couple of    echogenic calcifications within the left testicle. Bilateral epididymides are normal in appearance. There is a moderate-sized extratesticular fluid collection on the right.           Impression       1. 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 2. Associated moderate hydrocele on the right. 3. 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          IMPRESSION/Plan:    Scrotal swelling - SABA with mass concerning for neoplasm    Discussed with Dr Jesus Borrego, will order CT abd/pelvis with contrast  AFP, HCG qualitative, LDH    Ok for discharge once tests complete  Our office will schedule follow up for tomorrow, note sent to       Thank you for including us in the care of [address] Gabriel Rice, APRN - CNP, APRN  02/21/22 2:53 PM  Urology

## 2022-02-22 ENCOUNTER — PREP FOR PROCEDURE (OUTPATIENT)
Dept: UROLOGY | Age: 27
End: 2022-02-22

## 2022-02-22 ENCOUNTER — HOSPITAL ENCOUNTER (OUTPATIENT)
Age: 27
Discharge: HOME | End: 2022-02-22
Payer: COMMERCIAL

## 2022-02-22 ENCOUNTER — TELEPHONE (OUTPATIENT)
Dept: UROLOGY | Age: 27
End: 2022-02-22

## 2022-02-22 ENCOUNTER — HOSPITAL ENCOUNTER (OUTPATIENT)
Dept: GENERAL RADIOLOGY | Age: 27
Discharge: HOME | End: 2022-02-22
Payer: COMMERCIAL

## 2022-02-22 ENCOUNTER — OFFICE VISIT (OUTPATIENT)
Dept: UROLOGY | Age: 27
End: 2022-02-22
Payer: COMMERCIAL

## 2022-02-22 VITALS
HEIGHT: 74 IN | WEIGHT: 205 LBS | SYSTOLIC BLOOD PRESSURE: 140 MMHG | DIASTOLIC BLOOD PRESSURE: 82 MMHG | BODY MASS INDEX: 26.31 KG/M2

## 2022-02-22 DIAGNOSIS — N50.89 TESTICULAR MASS: Primary | ICD-10-CM

## 2022-02-22 DIAGNOSIS — N50.89 TESTICULAR MASS: ICD-10-CM

## 2022-02-22 LAB — AFP-TUMOR MARKER: 3 UG/L

## 2022-02-22 PROCEDURE — 99214 OFFICE O/P EST MOD 30 MIN: CPT | Performed by: NURSE PRACTITIONER

## 2022-02-22 PROCEDURE — 36415 COLL VENOUS BLD VENIPUNCTURE: CPT

## 2022-02-22 PROCEDURE — 84702 CHORIONIC GONADOTROPIN TEST: CPT

## 2022-02-22 PROCEDURE — 71046 X-RAY EXAM CHEST 2 VIEWS: CPT

## 2022-02-22 RX ORDER — CIPROFLOXACIN 2 MG/ML
400 INJECTION, SOLUTION INTRAVENOUS
Status: CANCELLED | OUTPATIENT
Start: 2022-02-23

## 2022-02-22 RX ORDER — SODIUM CHLORIDE 9 MG/ML
INJECTION, SOLUTION INTRAVENOUS CONTINUOUS
Status: CANCELLED | OUTPATIENT
Start: 2022-02-23

## 2022-02-22 ASSESSMENT — ENCOUNTER SYMPTOMS
VOMITING: 0
BACK PAIN: 0
NAUSEA: 0
ABDOMINAL PAIN: 0

## 2022-02-22 NOTE — TELEPHONE ENCOUNTER
DO NOT TAKE ASPIRIN,  FISH OIL, COUMADIN, IBUPROFEN, MOTRIN-LIKE DRUGS AND ANY MULTIVITAMINS OR OVER THE COUNTER SUPPLEMENTS 14 DAYS PRIOR TO SURGERY. Alfonso Wilson 1995 Diagnosis:     Surgical Physician: Dr. Aleah Avendano have been scheduled for the procedure marked below:      Surgery: Right Radical Inguinal Orchiectomy         Date: 2/23/2022     Anesthesia: Anesthesiologist (General/Spinal)     Place of Service: [address] [place] [address] Second Floor Same Day Surgery         Arrive to same day surgery by:  9:30 am  (Surgery time is subject to change)      INSTRUCTIONS AS MARKED BELOW:    1.  DO NOT eat or drink anything after midnight before surgery. 2.  We prefer you shower or bathe with an antibacterial soap (Dial) the morning of surgery. 3.  Please ensure to have a  with you to transport you home. 4.  Please bring a current medication list, photo ID and insurance card(s) with you  5. Okay to take Tylenol  6. If you take Glucophage, Metformin or Janumet, hold 48-hours prior to surgery  7. Take blood pressure or heart medication as directed, if taken in the morning take with a small sip of water  8. The office will call you in 1-2 days after your procedure to schedule a follow up.       DATE SENSITIVE TESTING-DO ON THE DATE LISTED BELOW*WALK IN *NO APPOINTMENT        Date: 2/22/2022

## 2022-02-22 NOTE — PATIENT INSTRUCTIONS
Patient Education        Orchiectomy: Before Your Surgery  What is an orchiectomy? Orchiectomy (say \"mb-pqy-SG-tuh-orly\") is surgery to remove one or both of your testicles. It is usually done to treat testicular cancer. It may also be done for other reasons, such as removing a damaged testicle or as part of treatment for prostate cancer. For testicular cancer, the surgery is called a radical inguinal orchiectomy. The doctor makes a cut in the lower belly. The testicle or testicles are removed and the cut is closed with stitches. For a simple orchiectomy, the doctor removes one or both testicles through a cut in the scrotum. If desired, artificial testicles (saline implants) can be put into the scrotum. You should be able to do most of your normal activities after 2 to 3 weeks. But you will not be able to do anything that requires your body to work hard. It's important not to strain with bowel movements or to lift heavy things. You will probably need to take 2 to 3 weeks off from work. It depends on the type of work you do and how you feel. With one testicle, you can still get an erection or father a child. But if both testicles are removed, you will not be able to father a child. And you may have problems getting an erection. It is common to feel sad or depressed after this surgery. You may have concerns about body image and sex. Ask your doctor about support groups or other resources that can help. Follow-up care is a key part of your treatment and safety. Be sure to make and go to all appointments, and call your doctor if you are having problems. It's also a good idea to know your test results and keep a list of the medicines you take. How do you prepare for surgery? Surgery can be stressful. This information will help you understand what you can expect. And it will help you safely prepare for surgery. Preparing for surgery    · Be sure you have someone to take you home.  Anesthesia and pain medicine will make it unsafe for you to drive or get home on your own.     · Understand exactly what surgery is planned, along with the risks, benefits, and other options.     · If you take aspirin or some other blood thinner, ask your doctor if you should stop taking it before your surgery. Make sure that you understand exactly what your doctor wants you to do. These medicines increase the risk of bleeding.     · Tell your doctor ALL the medicines, vitamins, supplements, and herbal remedies you take. Some may increase the risk of problems during your surgery. Your doctor will tell you if you should stop taking any of them before the surgery and how soon to do it.     · Make sure your doctor and the hospital have a copy of your advance directive. If you don't have one, you may want to prepare one. It lets others know your health care wishes. It's a good thing to have before any type of surgery or procedure. What happens on the day of surgery? · Follow the instructions exactly about when to stop eating and drinking. If you don't, your surgery may be canceled. If your doctor told you to take your medicines on the day of surgery, take them with only a sip of water.     · Take a bath or shower before you come in for your surgery. Do not apply lotions, perfumes, deodorants, or nail polish.     · Do not shave the surgical site yourself.     · Take off all jewelry and piercings. And take out contact lenses, if you wear them. At the hospital or surgery center   · Bring a picture ID.     · The area for surgery is often marked to make sure there are no errors.     · You will be kept comfortable and safe by your anesthesia provider. You will be asleep during the surgery.     · The surgery will take about 1 hour. When should you call your doctor? · You have questions or concerns.     · You don't understand how to prepare for your surgery.     · You become ill before the surgery (such as fever, flu, or a cold).      · You need to reschedule or have changed your mind about having the surgery. Where can you learn more? Go to [email or web address] and sign in to your [employer] account. Enter X728 in the Javelin box to learn more about \"Orchiectomy: Before Your Surgery. \"     If you do not have an account, please click on the \"Sign Up Now\" link. Current as of: September 8, 2021               Content Version: 13.1  © 3478-2614 Healthwise, Incorporated. Care instructions adapted under license by [facility] ([facility]). If you have questions about a medical condition or this instruction, always ask your healthcare professional. [address] any warranty or liability for your use of this information.

## 2022-02-22 NOTE — TELEPHONE ENCOUNTER
SURGERY 826  [address] [place], [place] [address] [facility] [address] [facility], [address]      Phone *[phone] *[phone]   Surgical Scheduling Direct Line Phone *[phone] Fax *[phone]      Asher Brennan 1995 male    [address]  [address] [facility] [id number]  Marital Status: Single         Home Phone: [phone]      Cell Phone:    Telephone Information:   Mobile [phone]          Surgeon: Dr. Jazzmine Melton Surgery Date: 2/23/2022   Time: 11:30 am    Procedure: Right Radical Inguinal Orchiectomy    Diagnosis: Testicular mass, testicular pain    Important Medical History:  In [facility]    Special Inst/Equip: Regular    CPT Codes:    21672  Latex Allergy: No     Cardiac Device:  No    Anesthesia:  General          Admission Type:  Same Day                        Admit Prior to Day of Surgery: No    Case Location:  Main OR            Preadmission Testing:  Phone Call          PAT Date and Time:______________________________________________________    PAT Confirmation #: ______________________________________________________    Post Op Visit: ___________________________________________________________    Need Preop Cardiac Clearance: No    Does Patient have Cardiologist/physician?     none    Surgery Confirmation #: __________________________________________________    : ________________________   Date: __________________________     [employer] Name: [facility] [place]

## 2022-02-22 NOTE — TELEPHONE ENCOUNTER
Patient scheduled with Dr. Owen Tatum on 2/23/2022. Surgery consent to be done upon arrival.  Surgery instructions gone over with patient verbally, Patient voiced understanding.

## 2022-02-22 NOTE — PROGRESS NOTES
[address] [facility] [address]  Dept: [phone]  Loc: [phone]    Visit Date: 2/22/2022        HPI:     Krzysztof Quinones is a 32 y.o. male who presents today for:  Chief Complaint   Patient presents with    New Patient     seen in ED yesterday. right side testicular swelling and pain. imaging done. HPI   Pt seen in evaluation for R testicular mass    Jey Jung reports swelling to R scrotum associated with pain for the last 1 month time. Notes the pain is an ache that is intermittently sharp and increases to 4-3/02 with movement/certain activities. Notes pain is improved with rest.  Denies issues with urination. No dysuria or hematuria. Scrotal US with 6.5 cm lobulated hypervascular mass in the R testicle concerning for neoplasm, R moderate hydrocele, left testicular microlithiasis. CT abd/pelvis negative for evidence of obvious metastatic disease. AFP pending. HCG qual negative (quanitative not performed), . Denies family hx of testicular ca. Notes hx of \"blood ca\" in maternal grandmother. No current outpatient medications on file. No current facility-administered medications for this visit. Past Medical History  eJy Jung  has a past medical history of Nicotine dependence. Past Surgical History  The patient  has no past surgical history on file. Family History  This patient's family history includes Cancer in his paternal grandfather. Social History  Jey Jung  reports that he has quit smoking. His smoking use included cigarettes. He started smoking about a year ago. He has a 1.00 pack-year smoking history. He has never used smokeless tobacco. He reports current alcohol use. He reports that he does not use drugs.       Subjective:      Review of Systems   Constitutional: Negative for activity change, appetite change, chills, diaphoresis, fatigue, fever and unexpected weight change. Gastrointestinal: Negative for abdominal pain, nausea and vomiting. Genitourinary: Positive for scrotal swelling and testicular pain. Negative for decreased urine volume, difficulty urinating, dysuria, flank pain, frequency, hematuria and urgency. Musculoskeletal: Negative for back pain. Objective:   BP (!) 140/82   Ht 6' 2\" (1.88 m)   Wt 205 lb (93 kg)   BMI 26.32 kg/m²     Physical Exam  Vitals reviewed. Constitutional:       General: He is not in acute distress. Appearance: Normal appearance. He is well-developed. He is not ill-appearing or diaphoretic. HENT:      Head: Normocephalic and atraumatic. Right Ear: External ear normal.      Left Ear: External ear normal.      Nose: Nose normal.      Mouth/Throat:      Mouth: Mucous membranes are moist.   Eyes:      General: No scleral icterus. Right eye: No discharge. Left eye: No discharge. Neck:      Vascular: No JVD. Trachea: No tracheal deviation. Pulmonary:      Effort: Pulmonary effort is normal. No respiratory distress. Abdominal:      General: There is no distension. Tenderness: There is no abdominal tenderness. There is no right CVA tenderness or left CVA tenderness. Genitourinary:     Comments: R scrotal swelling--unable to fully palpate R testicle secondary to swelling. Fullness to upper scrotum. Upper R scrotum tender to palpation otherwise no significant tenderness. L testicle normal to palpation. Musculoskeletal:         General: Normal range of motion. Neurological:      Mental Status: He is alert and oriented to person, place, and time. Mental status is at baseline. Psychiatric:         Mood and Affect: Mood normal.         Behavior: Behavior normal.         Thought Content: Thought content normal.         POC  No results found for this visit on 02/22/22.       Patients recent PSA values are as follows  No results found for: PSA, PSADIA     Recent BUN/Creatinine:  Lab Results Component Value Date    BUN 10 02/21/2022    CREATININE 0.8 02/21/2022       Radiology  The patient has had a Scrotal Ultrasound which I have independently reviewed along with its accompanying report. The study demonstrates 6.5 cm lobulated hypervascular mass in the R testicle concerning for neoplasm, R moderate hydrocele, left testicular microlithiasis     The patient has had a CT abd/pelvis which I have reviewed along with its accompanying report. The study demonstrates no obvious evidence of metastatic disease. Assessment:   R testicular mass  R scrotal pain and swelling    Plan:     Check quanitative HCG and chest xray now. Reviewed case with Dr. Jaxon Prater. Recommend proceeding with radical right orchiectomy. Discussed radical orchiectomy with Farzaneh Bishop and he is agreeable. I described the procedure in detail and also described the associated risks and benefits at length. We discussed possible alternative therapies. We discussed the risks and benefits of not undergoing therapy. Patient understands these risks and benefits and desires to proceed. He will be scheduled for R radical inguinal orchiectomy with Dr. Wili Leung 2/23/22.

## 2022-02-23 ENCOUNTER — HOSPITAL ENCOUNTER (OUTPATIENT)
Age: 27
Setting detail: OUTPATIENT SURGERY
Discharge: HOME | End: 2022-02-23
Attending: UROLOGY | Admitting: UROLOGY
Payer: COMMERCIAL

## 2022-02-23 ENCOUNTER — ANESTHESIA (OUTPATIENT)
Dept: OPERATING ROOM | Age: 27
End: 2022-02-23
Payer: COMMERCIAL

## 2022-02-23 ENCOUNTER — ANESTHESIA EVENT (OUTPATIENT)
Dept: OPERATING ROOM | Age: 27
End: 2022-02-23
Payer: COMMERCIAL

## 2022-02-23 VITALS
TEMPERATURE: 98 F | WEIGHT: 212 LBS | DIASTOLIC BLOOD PRESSURE: 80 MMHG | BODY MASS INDEX: 27.21 KG/M2 | RESPIRATION RATE: 14 BRPM | SYSTOLIC BLOOD PRESSURE: 140 MMHG | HEART RATE: 74 BPM | OXYGEN SATURATION: 97 % | HEIGHT: 74 IN

## 2022-02-23 VITALS — DIASTOLIC BLOOD PRESSURE: 58 MMHG | OXYGEN SATURATION: 100 % | SYSTOLIC BLOOD PRESSURE: 114 MMHG | TEMPERATURE: 98.6 F

## 2022-02-23 DIAGNOSIS — G89.18 POST-OP PAIN: Primary | ICD-10-CM

## 2022-02-23 PROCEDURE — 2500000003 HC RX 250 WO HCPCS

## 2022-02-23 PROCEDURE — 3600000012 HC SURGERY LEVEL 2 ADDTL 15MIN: Performed by: UROLOGY

## 2022-02-23 PROCEDURE — 3600000002 HC SURGERY LEVEL 2 BASE: Performed by: UROLOGY

## 2022-02-23 PROCEDURE — 6360000002 HC RX W HCPCS: Performed by: ANESTHESIOLOGY

## 2022-02-23 PROCEDURE — 88309 TISSUE EXAM BY PATHOLOGIST: CPT

## 2022-02-23 PROCEDURE — 6360000002 HC RX W HCPCS: Performed by: UROLOGY

## 2022-02-23 PROCEDURE — 7100000001 HC PACU RECOVERY - ADDTL 15 MIN: Performed by: UROLOGY

## 2022-02-23 PROCEDURE — 7100000010 HC PHASE II RECOVERY - FIRST 15 MIN: Performed by: UROLOGY

## 2022-02-23 PROCEDURE — 7100000000 HC PACU RECOVERY - FIRST 15 MIN: Performed by: UROLOGY

## 2022-02-23 PROCEDURE — 2709999900 HC NON-CHARGEABLE SUPPLY: Performed by: UROLOGY

## 2022-02-23 PROCEDURE — 3700000001 HC ADD 15 MINUTES (ANESTHESIA): Performed by: UROLOGY

## 2022-02-23 PROCEDURE — 3700000000 HC ANESTHESIA ATTENDED CARE: Performed by: UROLOGY

## 2022-02-23 PROCEDURE — 6370000000 HC RX 637 (ALT 250 FOR IP)

## 2022-02-23 PROCEDURE — 2500000003 HC RX 250 WO HCPCS: Performed by: UROLOGY

## 2022-02-23 PROCEDURE — 2580000003 HC RX 258: Performed by: UROLOGY

## 2022-02-23 PROCEDURE — 6360000002 HC RX W HCPCS

## 2022-02-23 PROCEDURE — 7100000011 HC PHASE II RECOVERY - ADDTL 15 MIN: Performed by: UROLOGY

## 2022-02-23 RX ORDER — MIDAZOLAM HYDROCHLORIDE 1 MG/ML
INJECTION INTRAMUSCULAR; INTRAVENOUS PRN
Status: DISCONTINUED | OUTPATIENT
Start: 2022-02-23 | End: 2022-02-23 | Stop reason: SDUPTHER

## 2022-02-23 RX ORDER — FENTANYL CITRATE 50 UG/ML
50 INJECTION, SOLUTION INTRAMUSCULAR; INTRAVENOUS EVERY 5 MIN PRN
Status: COMPLETED | OUTPATIENT
Start: 2022-02-23 | End: 2022-02-23

## 2022-02-23 RX ORDER — DOXYCYCLINE HYCLATE 100 MG
100 TABLET ORAL 2 TIMES DAILY
Qty: 10 TABLET | Refills: 0 | Status: SHIPPED | OUTPATIENT
Start: 2022-02-23 | End: 2022-02-28

## 2022-02-23 RX ORDER — KETOROLAC TROMETHAMINE 30 MG/ML
INJECTION, SOLUTION INTRAMUSCULAR; INTRAVENOUS PRN
Status: DISCONTINUED | OUTPATIENT
Start: 2022-02-23 | End: 2022-02-23 | Stop reason: SDUPTHER

## 2022-02-23 RX ORDER — FENTANYL CITRATE 50 UG/ML
INJECTION, SOLUTION INTRAMUSCULAR; INTRAVENOUS PRN
Status: DISCONTINUED | OUTPATIENT
Start: 2022-02-23 | End: 2022-02-23 | Stop reason: SDUPTHER

## 2022-02-23 RX ORDER — LIDOCAINE HCL/PF 100 MG/5ML
SYRINGE (ML) INJECTION PRN
Status: DISCONTINUED | OUTPATIENT
Start: 2022-02-23 | End: 2022-02-23 | Stop reason: SDUPTHER

## 2022-02-23 RX ORDER — SODIUM CHLORIDE 9 MG/ML
INJECTION, SOLUTION INTRAVENOUS CONTINUOUS
Status: DISCONTINUED | OUTPATIENT
Start: 2022-02-23 | End: 2022-02-23 | Stop reason: HOSPADM

## 2022-02-23 RX ORDER — DEXAMETHASONE SODIUM PHOSPHATE 10 MG/ML
INJECTION, EMULSION INTRAMUSCULAR; INTRAVENOUS PRN
Status: DISCONTINUED | OUTPATIENT
Start: 2022-02-23 | End: 2022-02-23 | Stop reason: SDUPTHER

## 2022-02-23 RX ORDER — OXYCODONE HYDROCHLORIDE AND ACETAMINOPHEN 5; 325 MG/1; MG/1
1 TABLET ORAL EVERY 6 HOURS PRN
Qty: 30 TABLET | Refills: 0 | Status: SHIPPED | OUTPATIENT
Start: 2022-02-23 | End: 2022-02-28

## 2022-02-23 RX ORDER — OXYCODONE HYDROCHLORIDE 5 MG/1
TABLET ORAL
Status: COMPLETED
Start: 2022-02-23 | End: 2022-02-23

## 2022-02-23 RX ORDER — SODIUM CHLORIDE 0.9 % (FLUSH) 0.9 %
5-40 SYRINGE (ML) INJECTION PRN
Status: DISCONTINUED | OUTPATIENT
Start: 2022-02-23 | End: 2022-02-23 | Stop reason: HOSPADM

## 2022-02-23 RX ORDER — PROPOFOL 10 MG/ML
INJECTION, EMULSION INTRAVENOUS PRN
Status: DISCONTINUED | OUTPATIENT
Start: 2022-02-23 | End: 2022-02-23 | Stop reason: SDUPTHER

## 2022-02-23 RX ORDER — SODIUM CHLORIDE 0.9 % (FLUSH) 0.9 %
5-40 SYRINGE (ML) INJECTION EVERY 12 HOURS SCHEDULED
Status: DISCONTINUED | OUTPATIENT
Start: 2022-02-23 | End: 2022-02-23 | Stop reason: HOSPADM

## 2022-02-23 RX ORDER — ONDANSETRON 2 MG/ML
INJECTION INTRAMUSCULAR; INTRAVENOUS PRN
Status: DISCONTINUED | OUTPATIENT
Start: 2022-02-23 | End: 2022-02-23 | Stop reason: SDUPTHER

## 2022-02-23 RX ORDER — SODIUM CHLORIDE 9 MG/ML
25 INJECTION, SOLUTION INTRAVENOUS PRN
Status: DISCONTINUED | OUTPATIENT
Start: 2022-02-23 | End: 2022-02-23 | Stop reason: HOSPADM

## 2022-02-23 RX ORDER — CIPROFLOXACIN 2 MG/ML
400 INJECTION, SOLUTION INTRAVENOUS
Status: COMPLETED | OUTPATIENT
Start: 2022-02-23 | End: 2022-02-23

## 2022-02-23 RX ORDER — BUPIVACAINE HYDROCHLORIDE 5 MG/ML
INJECTION, SOLUTION EPIDURAL; INTRACAUDAL PRN
Status: DISCONTINUED | OUTPATIENT
Start: 2022-02-23 | End: 2022-02-23 | Stop reason: ALTCHOICE

## 2022-02-23 RX ORDER — OXYCODONE HYDROCHLORIDE 5 MG/1
5 TABLET ORAL ONCE
Status: COMPLETED | OUTPATIENT
Start: 2022-02-23 | End: 2022-02-23

## 2022-02-23 RX ADMIN — PROPOFOL 200 MG: 10 INJECTION, EMULSION INTRAVENOUS at 11:13

## 2022-02-23 RX ADMIN — CIPROFLOXACIN 400 MG: 2 INJECTION, SOLUTION INTRAVENOUS at 11:17

## 2022-02-23 RX ADMIN — FENTANYL CITRATE 50 MCG: 50 INJECTION, SOLUTION INTRAMUSCULAR; INTRAVENOUS at 11:17

## 2022-02-23 RX ADMIN — FENTANYL CITRATE 50 MCG: 50 INJECTION, SOLUTION INTRAMUSCULAR; INTRAVENOUS at 12:22

## 2022-02-23 RX ADMIN — HYDROMORPHONE HYDROCHLORIDE 0.25 MG: 1 INJECTION, SOLUTION INTRAMUSCULAR; INTRAVENOUS; SUBCUTANEOUS at 12:40

## 2022-02-23 RX ADMIN — FENTANYL CITRATE 50 MCG: 50 INJECTION, SOLUTION INTRAMUSCULAR; INTRAVENOUS at 12:27

## 2022-02-23 RX ADMIN — OXYCODONE 5 MG: 5 TABLET ORAL at 12:47

## 2022-02-23 RX ADMIN — OXYCODONE HYDROCHLORIDE 5 MG: 5 TABLET ORAL at 12:47

## 2022-02-23 RX ADMIN — HYDROMORPHONE HYDROCHLORIDE 0.25 MG: 1 INJECTION, SOLUTION INTRAMUSCULAR; INTRAVENOUS; SUBCUTANEOUS at 12:35

## 2022-02-23 RX ADMIN — Medication 100 MG: at 11:13

## 2022-02-23 RX ADMIN — KETOROLAC TROMETHAMINE 30 MG: 30 INJECTION, SOLUTION INTRAMUSCULAR; INTRAVENOUS at 11:54

## 2022-02-23 RX ADMIN — SODIUM CHLORIDE: 9 INJECTION, SOLUTION INTRAVENOUS at 10:17

## 2022-02-23 RX ADMIN — FENTANYL CITRATE 50 MCG: 50 INJECTION, SOLUTION INTRAMUSCULAR; INTRAVENOUS at 11:24

## 2022-02-23 RX ADMIN — MIDAZOLAM 2 MG: 1 INJECTION INTRAMUSCULAR; INTRAVENOUS at 11:13

## 2022-02-23 RX ADMIN — ONDANSETRON 4 MG: 2 INJECTION INTRAMUSCULAR; INTRAVENOUS at 11:55

## 2022-02-23 RX ADMIN — DEXAMETHASONE SODIUM PHOSPHATE 10 MG: 10 INJECTION, EMULSION INTRAMUSCULAR; INTRAVENOUS at 11:13

## 2022-02-23 ASSESSMENT — PULMONARY FUNCTION TESTS
PIF_VALUE: 14
PIF_VALUE: 17
PIF_VALUE: 17
PIF_VALUE: 15
PIF_VALUE: 17
PIF_VALUE: 16
PIF_VALUE: 0
PIF_VALUE: 0
PIF_VALUE: 16
PIF_VALUE: 15
PIF_VALUE: 2
PIF_VALUE: 16
PIF_VALUE: 15
PIF_VALUE: 17
PIF_VALUE: 15
PIF_VALUE: 15
PIF_VALUE: 5
PIF_VALUE: 2
PIF_VALUE: 2
PIF_VALUE: 17
PIF_VALUE: 15
PIF_VALUE: 17
PIF_VALUE: 15
PIF_VALUE: 15
PIF_VALUE: 14
PIF_VALUE: 15
PIF_VALUE: 17
PIF_VALUE: 4
PIF_VALUE: 3
PIF_VALUE: 14
PIF_VALUE: 16
PIF_VALUE: 3
PIF_VALUE: 4
PIF_VALUE: 4
PIF_VALUE: 2
PIF_VALUE: 16
PIF_VALUE: 16
PIF_VALUE: 15
PIF_VALUE: 15
PIF_VALUE: 17
PIF_VALUE: 16
PIF_VALUE: 14
PIF_VALUE: 16
PIF_VALUE: 0
PIF_VALUE: 24
PIF_VALUE: 17
PIF_VALUE: 16
PIF_VALUE: 15
PIF_VALUE: 2
PIF_VALUE: 3
PIF_VALUE: 16
PIF_VALUE: 16
PIF_VALUE: 15
PIF_VALUE: 20
PIF_VALUE: 15
PIF_VALUE: 2
PIF_VALUE: 16
PIF_VALUE: 3
PIF_VALUE: 17
PIF_VALUE: 17
PIF_VALUE: 14
PIF_VALUE: 17
PIF_VALUE: 15
PIF_VALUE: 16

## 2022-02-23 ASSESSMENT — PAIN DESCRIPTION - ORIENTATION
ORIENTATION: RIGHT
ORIENTATION: RIGHT

## 2022-02-23 ASSESSMENT — PAIN SCALES - GENERAL
PAINLEVEL_OUTOF10: 9
PAINLEVEL_OUTOF10: 5
PAINLEVEL_OUTOF10: 6
PAINLEVEL_OUTOF10: 4
PAINLEVEL_OUTOF10: 8
PAINLEVEL_OUTOF10: 7
PAINLEVEL_OUTOF10: 3
PAINLEVEL_OUTOF10: 6
PAINLEVEL_OUTOF10: 4
PAINLEVEL_OUTOF10: 4

## 2022-02-23 ASSESSMENT — ENCOUNTER SYMPTOMS
VOMITING: 0
NAUSEA: 0
BACK PAIN: 0
ABDOMINAL PAIN: 0

## 2022-02-23 ASSESSMENT — PAIN DESCRIPTION - DESCRIPTORS
DESCRIPTORS: DISCOMFORT

## 2022-02-23 ASSESSMENT — PAIN DESCRIPTION - LOCATION
LOCATION: SCROTUM
LOCATION: SCROTUM

## 2022-02-23 ASSESSMENT — PAIN - FUNCTIONAL ASSESSMENT: PAIN_FUNCTIONAL_ASSESSMENT: 0-10

## 2022-02-23 NOTE — PROGRESS NOTES
Discharge instructions discussed with the patient and mother. VErbalized understanding. Discharge complete. Off unit by w/c to car.

## 2022-02-23 NOTE — ANESTHESIA PRE PROCEDURE
Department of Anesthesiology  Preprocedure Note       Name:  Burnell Rinne   Age:  32 y.o.  :  1995                                          MRN:  [id number]         Date:  2022      Surgeon: Timmy Trujillo):  Sue Tanner MD    Procedure: Procedure(s):  RIGHT RADICAL INGUINAL ORCHIECTOMY    Medications prior to admission:   Prior to Admission medications    Not on File       Current medications:    Current Facility-Administered Medications   Medication Dose Route Frequency Provider Last Rate Last Admin    0.9 % sodium chloride infusion   IntraVENous Continuous Sue Tanner  mL/hr at 22 1017 New Bag at 22 1017    ciprofloxacin (CIPRO) IVPB 400 mg  400 mg IntraVENous 30 Min Pre-Op Sue Tanner MD           Allergies: Allergies   Allergen Reactions    Cefprozil        Problem List:    Patient Active Problem List   Diagnosis Code    Nicotine dependence F17.200       Past Medical History:        Diagnosis Date    Nicotine dependence        Past Surgical History:  History reviewed. No pertinent surgical history. Social History:    Social History     Tobacco Use    Smoking status: Former Smoker     Packs/day: 0.50     Years: 2.00     Pack years: 1.00     Types: Cigarettes     Start date: 2021    Smokeless tobacco: Never Used   Substance Use Topics    Alcohol use:  Yes     Alcohol/week: 0.0 standard drinks     Comment: occasional                                Counseling given: Not Answered      Vital Signs (Current):   Vitals:    22 0935   BP: (!) 141/90   Pulse: 91   Resp: 16   Temp: 98.7 °F (37.1 °C)   SpO2: 96%                                              BP Readings from Last 3 Encounters:   22 (!) 141/90   22 (!) 140/82   22 (!) 171/90       NPO Status:                                                                                 BMI:   Wt Readings from Last 3 Encounters:   22 205 lb (93 kg)   22 210 lb (95.3 kg)   21 185 lb (83.9 kg)     There is no height or weight on file to calculate BMI.    CBC:   Lab Results   Component Value Date    WBC 9.6 02/21/2022    RBC 5.33 02/21/2022    HGB 16.7 02/21/2022    HCT 49.3 02/21/2022    MCV 92.5 02/21/2022    RDW 12.3 07/06/2013     02/21/2022       CMP:   Lab Results   Component Value Date     02/21/2022    K 4.5 02/21/2022     02/21/2022    CO2 23 02/21/2022    BUN 10 02/21/2022    CREATININE 0.8 02/21/2022    LABGLOM >90 02/21/2022    GLUCOSE 90 02/21/2022    PROT 7.6 02/21/2022    CALCIUM 9.6 02/21/2022    BILITOT 0.5 02/21/2022    ALKPHOS 119 02/21/2022    AST 19 02/21/2022    ALT 20 02/21/2022       POC Tests: No results for input(s): POCGLU, POCNA, POCK, POCCL, POCBUN, POCHEMO, POCHCT in the last 72 hours. Coags: No results found for: PROTIME, INR, APTT    HCG (If Applicable):   Lab Results   Component Value Date    PREGSERUM NEGATIVE 02/21/2022        ABGs: No results found for: PHART, PO2ART, DKN8AFH, WWX2AZN, BEART, M7LVQGCF     Type & Screen (If Applicable):  No results found for: LABABO, LABRH    Drug/Infectious Status (If Applicable):  No results found for: HIV, HEPCAB    COVID-19 Screening (If Applicable):   Lab Results   Component Value Date    COVID19 DETECTED 09/20/2021           Anesthesia Evaluation  Patient summary reviewed  Airway: Mallampati: II  TM distance: >3 FB   Neck ROM: full  Mouth opening: > = 3 FB Dental:          Pulmonary:                              Cardiovascular:                      Neuro/Psych:               GI/Hepatic/Renal:             Endo/Other:                     Abdominal:             Vascular: Other Findings:             Anesthesia Plan      general     ASA 2       Induction: intravenous. MIPS: Postoperative opioids intended and Prophylactic antiemetics administered. Anesthetic plan and risks discussed with patient and mother. Plan discussed with MONIKA. Aleta Patrick.  DO Elena

## 2022-02-23 NOTE — ANESTHESIA POSTPROCEDURE EVALUATION
Department of Anesthesiology  Postprocedure Note    Patient: Carlos Barajas  MRN: [id number]  YOB: 1995  Date of evaluation: 2/23/2022  Time:  4:04 PM     Procedure Summary     Date: 02/23/22 Room / Location: [facility] [address] / [address] OR    Anesthesia Start: 1107 Anesthesia Stop: 2758    Procedure: RIGHT RADICAL INGUINAL ORCHIECTOMY (Right ) Diagnosis: (Testicular mass Testicular pain)    Surgeons: Fuad Coates MD Responsible Provider: Owen Panchal DO    Anesthesia Type: General ASA Status: 2          Anesthesia Type: General    Deonte Phase I: Deonte Score: 10    Deonte Phase II: Deonte Score: 10    Last vitals: Reviewed and per EMR flowsheets.        Anesthesia Post Evaluation    Patient location during evaluation: PACU  Patient participation: complete - patient participated  Level of consciousness: awake and alert  Airway patency: patent  Nausea & Vomiting: no nausea  Complications: no  Cardiovascular status: blood pressure returned to baseline and hemodynamically stable  Respiratory status: acceptable and spontaneous ventilation  Hydration status: euvolemic

## 2022-02-23 NOTE — OP NOTE
patient's condition necessitate future retroperitoneal lymph node dissection. The wound was inspected and exquisite hemostasis was achieved. The wound was then irrigated. The external ring was reconstructed with 2-0 Vicryl. The Faisal's fascia was re-approximated with 2-0 Vicryl. The skin was closed using 4-0 Monocryl in a running, subcuticular manner. Dermabond skin glue was place on the incision and allowed to dry. The patient was then cleaned off, awakened, extubated, and discharged back to the PACU in good and stable condition. Follow-Up: We need to see the patient back in 1-2 weeks to discuss his pathology report with him.     Jayme Whitt MD  Electronically signed on 2/23/2022 at 12:09 PM

## 2022-02-23 NOTE — H&P
[address] OR  [address] [place] [id number]  Dept: [phone]  Loc: [phone]    Visit Date: 2/22/2022        HPI:     Asher Brennan is a 32 y.o. male who presents today for:  No chief complaint on file. HPI   Pt seen in evaluation for R testicular mass    Claudean Loach reports swelling to R scrotum associated with pain for the last 1 month time. Notes the pain is an ache that is intermittently sharp and increases to 0-3/71 with movement/certain activities. Notes pain is improved with rest.  Denies issues with urination. No dysuria or hematuria. Scrotal US with 6.5 cm lobulated hypervascular mass in the R testicle concerning for neoplasm, R moderate hydrocele, left testicular microlithiasis. CT abd/pelvis negative for evidence of obvious metastatic disease. AFP pending. HCG qual negative (quanitative not performed), . Denies family hx of testicular ca. Notes hx of \"blood ca\" in maternal grandmother. Current Facility-Administered Medications   Medication Dose Route Frequency Provider Last Rate Last Admin    0.9 % sodium chloride infusion   IntraVENous Continuous Zoe Singer  mL/hr at 02/23/22 1017 New Bag at 02/23/22 1017    ciprofloxacin (CIPRO) IVPB 400 mg  400 mg IntraVENous 30 Min Pre-Op Zoe Singer MD        sodium chloride flush 0.9 % injection 5-40 mL  5-40 mL IntraVENous 2 times per day Louise Pates, DO        sodium chloride flush 0.9 % injection 5-40 mL  5-40 mL IntraVENous PRN Louise Pates, DO        0.9 % sodium chloride infusion  25 mL IntraVENous PRN Louise Pates, DO        HYDROmorphone (DILAUDID) injection 0.25 mg  0.25 mg IntraVENous Q5 Min PRN Louise Pates, DO        fentaNYL (SUBLIMAZE) injection 50 mcg  50 mcg IntraVENous Q5 Min PRN Louise Pates, DO           Past Medical History  Claudean Loach  has a past medical history of Nicotine dependence.     Past Surgical History  The patient  has no past surgical history on file. Family History  This patient's family history includes Cancer in his paternal grandfather. Social History  [employer]  reports that he has quit smoking. His smoking use included cigarettes. He started smoking about a year ago. He has a 1.00 pack-year smoking history. He has never used smokeless tobacco. He reports current alcohol use. He reports that he does not use drugs. Subjective:      Review of Systems   Constitutional: Negative for activity change, appetite change, chills, diaphoresis, fatigue, fever and unexpected weight change. Gastrointestinal: Negative for abdominal pain, nausea and vomiting. Genitourinary: Positive for scrotal swelling and testicular pain. Negative for decreased urine volume, difficulty urinating, dysuria, flank pain, frequency, hematuria and urgency. Musculoskeletal: Negative for back pain. Objective:   BP (!) 140/82   Ht 6' 2\" (1.88 m)   Wt 205 lb (93 kg)   BMI 26.32 kg/m²     Physical Exam  Vitals reviewed. Constitutional:       General: He is not in acute distress. Appearance: Normal appearance. He is well-developed. He is not ill-appearing or diaphoretic. HENT:      Head: Normocephalic and atraumatic. Right Ear: External ear normal.      Left Ear: External ear normal.      Nose: Nose normal.      Mouth/Throat:      Mouth: Mucous membranes are moist.   Eyes:      General: No scleral icterus. Right eye: No discharge. Left eye: No discharge. Neck:      Vascular: No JVD. Trachea: No tracheal deviation. Pulmonary:      Effort: Pulmonary effort is normal. No respiratory distress. Abdominal:      General: There is no distension. Tenderness: There is no abdominal tenderness. There is no right CVA tenderness or left CVA tenderness. Genitourinary:     Comments: R scrotal swelling--unable to fully palpate R testicle secondary to swelling. Fullness to upper scrotum.   Upper R scrotum tender to palpation otherwise no significant tenderness. L testicle normal to palpation. Musculoskeletal:         General: Normal range of motion. Neurological:      Mental Status: He is alert and oriented to person, place, and time. Mental status is at baseline. Psychiatric:         Mood and Affect: Mood normal.         Behavior: Behavior normal.         Thought Content: Thought content normal.         POC  No results found for this visit on 02/22/22. Patients recent PSA values are as follows  No results found for: PSA, PSADIA     Recent BUN/Creatinine:  Lab Results   Component Value Date    BUN 10 02/21/2022    CREATININE 0.8 02/21/2022       Radiology  The patient has had a Scrotal Ultrasound which I have independently reviewed along with its accompanying report. The study demonstrates 6.5 cm lobulated hypervascular mass in the R testicle concerning for neoplasm, R moderate hydrocele, left testicular microlithiasis     The patient has had a CT abd/pelvis which I have reviewed along with its accompanying report. The study demonstrates no obvious evidence of metastatic disease.      Assessment:   R testicular mass  R scrotal pain and swelling    Plan:       Right radical orchiectomy

## 2022-02-27 LAB — BETA HCG QUANT, MALE: 8 IU/L (ref 0–3)

## 2022-03-02 ENCOUNTER — TELEPHONE (OUTPATIENT)
Dept: UROLOGY | Age: 27
End: 2022-03-02

## 2022-03-02 ENCOUNTER — OFFICE VISIT (OUTPATIENT)
Dept: UROLOGY | Age: 27
End: 2022-03-02

## 2022-03-02 VITALS
DIASTOLIC BLOOD PRESSURE: 80 MMHG | BODY MASS INDEX: 27.08 KG/M2 | WEIGHT: 211 LBS | SYSTOLIC BLOOD PRESSURE: 120 MMHG | HEIGHT: 74 IN

## 2022-03-02 DIAGNOSIS — C62.90 MALIGNANT NEOPLASM OF TESTICLE, UNSPECIFIED LATERALITY, UNSPECIFIED WHETHER DESCENDED OR UNDESCENDED (HCC): Primary | ICD-10-CM

## 2022-03-02 DIAGNOSIS — C62.11 SEMINOMA OF DESCENDED RIGHT TESTIS (HCC): Primary | ICD-10-CM

## 2022-03-02 RX ORDER — OXYCODONE HYDROCHLORIDE AND ACETAMINOPHEN 5; 325 MG/1; MG/1
1-2 TABLET ORAL EVERY 6 HOURS PRN
Qty: 30 TABLET | Refills: 0 | Status: SHIPPED | OUTPATIENT
Start: 2022-03-02 | End: 2022-03-07

## 2022-03-02 RX ORDER — OXYCODONE HYDROCHLORIDE AND ACETAMINOPHEN 5; 325 MG/1; MG/1
1 TABLET ORAL EVERY 4 HOURS PRN
COMMUNITY
End: 2022-08-15

## 2022-03-02 RX ORDER — DOXYCYCLINE HYCLATE 100 MG
100 TABLET ORAL 2 TIMES DAILY
COMMUNITY
End: 2022-03-09

## 2022-03-03 ENCOUNTER — TELEPHONE (OUTPATIENT)
Dept: UROLOGY | Age: 27
End: 2022-03-03

## 2022-03-03 NOTE — TELEPHONE ENCOUNTER
Patient scheduled for CT ABDOMEN PELVIS W IV CONTRAST  at [address] [place] [address] on 5/25/2022 ARRIVAL  AM FOR A 10AM SCAN . NPO 4 HOURS PRIOR. Patient advised of instructions.   Order mailed/given to patient

## 2022-03-04 ENCOUNTER — TELEPHONE (OUTPATIENT)
Dept: UROLOGY | Age: 27
End: 2022-03-04

## 2022-03-07 ENCOUNTER — TELEPHONE (OUTPATIENT)
Dept: UROLOGY | Age: 27
End: 2022-03-07

## 2022-03-07 NOTE — TELEPHONE ENCOUNTER
Patient underwent right radical inguinal orchiectomy on 02/23/2022. Postop visit on 03/02/2022 with Dr Chip German. Referral made to Dr Precious Hsieh. He is weaning off the pain medication and noticed a lump on the right side. He does not know if it has always been there since surgery. He can't describe how it feels. He denies fever, chills, redness, or warmth. He has a ct scan scheduled in May and follow up in June. Please advise.  Thank you

## 2022-03-07 NOTE — TELEPHONE ENCOUNTER
Patient stated the lump may have been there since surgery. He noticed it now because he is weaning off the pain medication. If he notices any changes in size or increase pain at the site, he will call the office back.

## 2022-03-09 ENCOUNTER — TELEPHONE (OUTPATIENT)
Dept: ONCOLOGY | Age: 27
End: 2022-03-09

## 2022-03-09 ENCOUNTER — OFFICE VISIT (OUTPATIENT)
Dept: ONCOLOGY | Age: 27
End: 2022-03-09
Payer: COMMERCIAL

## 2022-03-09 ENCOUNTER — HOSPITAL ENCOUNTER (OUTPATIENT)
Dept: INFUSION THERAPY | Age: 27
Discharge: HOME | End: 2022-03-09
Payer: COMMERCIAL

## 2022-03-09 VITALS
TEMPERATURE: 98.5 F | RESPIRATION RATE: 16 BRPM | OXYGEN SATURATION: 94 % | DIASTOLIC BLOOD PRESSURE: 87 MMHG | HEART RATE: 86 BPM | SYSTOLIC BLOOD PRESSURE: 144 MMHG | HEIGHT: 74 IN | BODY MASS INDEX: 26.82 KG/M2 | WEIGHT: 209 LBS

## 2022-03-09 DIAGNOSIS — C62.90 SEMINOMA (HCC): ICD-10-CM

## 2022-03-09 DIAGNOSIS — C62.90 SEMINOMA (HCC): Primary | ICD-10-CM

## 2022-03-09 LAB — LD: 197 U/L (ref 100–190)

## 2022-03-09 PROCEDURE — 99211 OFF/OP EST MAY X REQ PHY/QHP: CPT

## 2022-03-09 PROCEDURE — 83615 LACTATE (LD) (LDH) ENZYME: CPT

## 2022-03-09 PROCEDURE — 84702 CHORIONIC GONADOTROPIN TEST: CPT

## 2022-03-09 PROCEDURE — 99204 OFFICE O/P NEW MOD 45 MIN: CPT | Performed by: INTERNAL MEDICINE

## 2022-03-09 PROCEDURE — 82105 ALPHA-FETOPROTEIN SERUM: CPT

## 2022-03-09 PROCEDURE — 36415 COLL VENOUS BLD VENIPUNCTURE: CPT

## 2022-03-09 ASSESSMENT — ENCOUNTER SYMPTOMS
CHEST TIGHTNESS: 0
COLOR CHANGE: 0
VOMITING: 0
SHORTNESS OF BREATH: 0
NAUSEA: 0
WHEEZING: 0
CONSTIPATION: 0
COUGH: 0
DIARRHEA: 0
ABDOMINAL PAIN: 0
SORE THROAT: 0

## 2022-03-09 NOTE — PATIENT INSTRUCTIONS
Today to include LDH, serum hCG tumor marker and alpha-fetoprotein tumor marker  Chest CT with contrast  Follow-up with me in 2 weeks to review data

## 2022-03-10 VITALS
HEART RATE: 86 BPM | RESPIRATION RATE: 16 BRPM | TEMPERATURE: 98.5 F | DIASTOLIC BLOOD PRESSURE: 87 MMHG | SYSTOLIC BLOOD PRESSURE: 144 MMHG | OXYGEN SATURATION: 94 %

## 2022-03-10 LAB — AFP-TUMOR MARKER: 3 UG/L

## 2022-03-14 LAB — BETA HCG QUANT, MALE: < 1 IU/L (ref 0–3)

## 2022-03-21 ENCOUNTER — HOSPITAL ENCOUNTER (OUTPATIENT)
Dept: CT IMAGING | Age: 27
Discharge: HOME | End: 2022-03-21
Payer: COMMERCIAL

## 2022-03-21 DIAGNOSIS — C62.90 SEMINOMA (HCC): ICD-10-CM

## 2022-03-21 PROCEDURE — 71260 CT THORAX DX C+: CPT

## 2022-03-21 PROCEDURE — 6360000004 HC RX CONTRAST MEDICATION: Performed by: INTERNAL MEDICINE

## 2022-03-21 RX ADMIN — IOPAMIDOL 80 ML: 755 INJECTION, SOLUTION INTRAVENOUS at 10:12

## 2022-03-23 PROBLEM — C62.12 MALIGNANT NEOPLASM OF DESCENDED LEFT TESTIS (HCC): Status: ACTIVE | Noted: 2022-03-23

## 2022-04-04 ENCOUNTER — TELEPHONE (OUTPATIENT)
Dept: ONCOLOGY | Age: 27
End: 2022-04-04

## 2022-08-06 ENCOUNTER — HOSPITAL ENCOUNTER (OUTPATIENT)
Dept: CT IMAGING | Age: 27
Discharge: HOME | End: 2022-08-06
Payer: COMMERCIAL

## 2022-08-06 DIAGNOSIS — C62.11 SEMINOMA OF DESCENDED RIGHT TESTIS (HCC): ICD-10-CM

## 2022-08-06 PROCEDURE — 6360000004 HC RX CONTRAST MEDICATION: Performed by: UROLOGY

## 2022-08-06 PROCEDURE — 74177 CT ABD & PELVIS W/CONTRAST: CPT

## 2022-08-06 RX ADMIN — IOPAMIDOL 80 ML: 755 INJECTION, SOLUTION INTRAVENOUS at 10:21

## 2022-08-15 ENCOUNTER — OFFICE VISIT (OUTPATIENT)
Dept: UROLOGY | Age: 27
End: 2022-08-15
Payer: COMMERCIAL

## 2022-08-15 VITALS
HEIGHT: 74 IN | BODY MASS INDEX: 26.56 KG/M2 | SYSTOLIC BLOOD PRESSURE: 128 MMHG | WEIGHT: 207 LBS | DIASTOLIC BLOOD PRESSURE: 82 MMHG

## 2022-08-15 DIAGNOSIS — C62.11 SEMINOMA OF DESCENDED RIGHT TESTIS (HCC): ICD-10-CM

## 2022-08-15 DIAGNOSIS — C62.90 MALIGNANT NEOPLASM OF TESTICLE, UNSPECIFIED LATERALITY, UNSPECIFIED WHETHER DESCENDED OR UNDESCENDED (HCC): Primary | ICD-10-CM

## 2022-08-15 DIAGNOSIS — R91.1 LUNG NODULE: ICD-10-CM

## 2022-08-15 PROCEDURE — 99214 OFFICE O/P EST MOD 30 MIN: CPT | Performed by: UROLOGY

## 2022-08-15 RX ORDER — ACETAMINOPHEN 325 MG/1
650 TABLET ORAL EVERY 6 HOURS PRN
COMMUNITY

## 2022-08-15 NOTE — PROGRESS NOTES
Dr. Arvilla Goltz, MD  [facility])  Urology Clinic      Patient:  Rick Logan  YOB: 1995  Date: 8/15/2022    HISTORY OF PRESENT ILLNESS:   The patient is a 32 y.o. 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 Overall the problem(s) : show no change. Associated Symptoms: No dysuria, gross hematuria. Pain Severity: 0/10    Summary of old records:   T2 seminoma on orchiectomy. Orch was Feb 2022. Aug 2022 CT scan was negative. Imaging/Labs reviewed during today's visit:  I have independently reviewed and verified the following films during today's visit. CT reviewed. Last several PSA's:  No results found for: PSA    Last total testosterone:  No results found for: TESTOSTERONE    Urinalysis today:  No results found for this visit on 08/15/22.     Last BUN and creatinine:  Lab Results   Component Value Date    BUN 10 02/21/2022     Lab Results   Component Value Date    CREATININE 0.8 02/21/2022       Imaging Reviewed during this Office Visit:   (results were independently reviewed by physician and radiology report verified)    PAST MEDICAL, FAMILY AND SOCIAL HISTORY UPDATE:  Past Medical History:   Diagnosis Date    Nicotine dependence     Testicular cancer [facility])      Past Surgical History:   Procedure Laterality Date    TESTICLE REMOVAL Right 2/23/2022    RIGHT RADICAL INGUINAL ORCHIECTOMY performed by Arvilla Goltz, MD at [address] [place] [address] History   Problem Relation Age of Onset    Cancer Paternal Grandfather         lung    Hypertension Neg Hx     Colon Cancer Neg Hx     Prostate Cancer Neg Hx      Outpatient Medications Marked as Taking for the 8/15/22 encounter (Office Visit) with Arvilla Goltz, MD   Medication Sig Dispense Refill    acetaminophen (TYLENOL) 325 MG tablet Take 650 mg by mouth every 6 hours as needed for Pain         Cefprozil  Social History     Tobacco Use   Smoking Status Former    Packs/day: 0.50

## 2022-12-30 ENCOUNTER — HOSPITAL ENCOUNTER (OUTPATIENT)
Dept: CT IMAGING | Age: 27
Discharge: HOME | End: 2022-12-30
Payer: COMMERCIAL

## 2022-12-30 DIAGNOSIS — R91.1 LUNG NODULE: ICD-10-CM

## 2022-12-30 DIAGNOSIS — C62.11 SEMINOMA OF DESCENDED RIGHT TESTIS (HCC): ICD-10-CM

## 2022-12-30 PROCEDURE — 71260 CT THORAX DX C+: CPT

## 2022-12-30 PROCEDURE — 74177 CT ABD & PELVIS W/CONTRAST: CPT

## 2022-12-30 PROCEDURE — 6360000004 HC RX CONTRAST MEDICATION: Performed by: UROLOGY

## 2022-12-30 RX ADMIN — IOPAMIDOL 80 ML: 755 INJECTION, SOLUTION INTRAVENOUS at 10:26

## 2023-01-18 ENCOUNTER — OFFICE VISIT (OUTPATIENT)
Dept: UROLOGY | Age: 28
End: 2023-01-18
Payer: COMMERCIAL

## 2023-01-18 ENCOUNTER — HOSPITAL ENCOUNTER (OUTPATIENT)
Age: 28
Discharge: HOME | End: 2023-01-18
Payer: COMMERCIAL

## 2023-01-18 ENCOUNTER — TELEPHONE (OUTPATIENT)
Dept: UROLOGY | Age: 28
End: 2023-01-18

## 2023-01-18 VITALS — HEIGHT: 74 IN | RESPIRATION RATE: 16 BRPM | WEIGHT: 205 LBS | BODY MASS INDEX: 26.31 KG/M2

## 2023-01-18 DIAGNOSIS — C62.90 MALIGNANT NEOPLASM OF TESTICLE, UNSPECIFIED LATERALITY, UNSPECIFIED WHETHER DESCENDED OR UNDESCENDED (HCC): ICD-10-CM

## 2023-01-18 DIAGNOSIS — C62.11 SEMINOMA OF DESCENDED RIGHT TESTIS (HCC): ICD-10-CM

## 2023-01-18 DIAGNOSIS — C62.90 MALIGNANT NEOPLASM OF TESTICLE, UNSPECIFIED LATERALITY, UNSPECIFIED WHETHER DESCENDED OR UNDESCENDED (HCC): Primary | ICD-10-CM

## 2023-01-18 LAB — LD: 190 U/L (ref 100–190)

## 2023-01-18 PROCEDURE — 84702 CHORIONIC GONADOTROPIN TEST: CPT

## 2023-01-18 PROCEDURE — 83615 LACTATE (LD) (LDH) ENZYME: CPT

## 2023-01-18 PROCEDURE — 99214 OFFICE O/P EST MOD 30 MIN: CPT | Performed by: UROLOGY

## 2023-01-18 PROCEDURE — 82105 ALPHA-FETOPROTEIN SERUM: CPT

## 2023-01-18 PROCEDURE — 36415 COLL VENOUS BLD VENIPUNCTURE: CPT

## 2023-01-18 NOTE — TELEPHONE ENCOUNTER
Patient scheduled for CT ABD PELV W IV; CT CHEST W  at [facility] on 7/12/23 ARRIVAL OF 810AM FOR AN 840AM SCAN. NPO 4 HOURS PRIOR.     Order mailed with instructions  to the patient

## 2023-01-18 NOTE — PROGRESS NOTES
Dr. Erica Marie MD  [facility])  Urology Clinic      Patient:  Sohail Francisco  YOB: 1995  Date: 1/18/2023    HISTORY OF PRESENT ILLNESS:   The patient is a 32 y.o. 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 Jan 2023 CT chest abd a pelvis negative. Patient did not have tumor markers checked. Overall the problem(s) : show no change. Associated Symptoms: No dysuria, gross hematuria. Pain Severity: 0/10    Summary of old records:   T2 seminoma on orchiectomy. Orch was Feb 2022. Aug 2022 CT scan was negative. Imaging/Labs reviewed during today's visit:  I have independently reviewed and verified the following films during today's visit. CT reviewed. Last several PSA's:  No results found for: PSA    Last total testosterone:  No results found for: TESTOSTERONE    Urinalysis today:  No results found for this visit on 01/18/23.     Last BUN and creatinine:  Lab Results   Component Value Date    BUN 10 02/21/2022     Lab Results   Component Value Date    CREATININE 0.8 02/21/2022       Imaging Reviewed during this Office Visit:   (results were independently reviewed by physician and radiology report verified)    PAST MEDICAL, FAMILY AND SOCIAL HISTORY UPDATE:  Past Medical History:   Diagnosis Date    Nicotine dependence     Testicular cancer [facility])      Past Surgical History:   Procedure Laterality Date    TESTICLE REMOVAL Right 2/23/2022    RIGHT RADICAL INGUINAL ORCHIECTOMY performed by Erica Marie MD at [address] [place] [address] History   Problem Relation Age of Onset    Cancer Paternal Grandfather         lung    Hypertension Neg Hx     Colon Cancer Neg Hx     Prostate Cancer Neg Hx      Outpatient Medications Marked as Taking for the 1/18/23 encounter (Office Visit) with Erica Marie MD   Medication Sig Dispense Refill    acetaminophen (TYLENOL) 325 MG tablet Take 650 mg by mouth every 6 hours as needed for Pain Cefprozil  Social History     Tobacco Use   Smoking Status Former    Packs/day: 0.50    Years: 2.00    Pack years: 1.00    Types: Cigarettes    Quit date:     Years since quittin.0   Smokeless Tobacco Never       Social History     Substance and Sexual Activity   Alcohol Use Yes    Alcohol/week: 0.0 standard drinks    Comment: occasional       REVIEW OF SYSTEMS:  Constitutional: negative  Eyes: negative  Respiratory: negative  Cardiovascular: negative  Gastrointestinal: negative  Musculoskeletal: negative  Genitourinary: negative except for what is in HPI  Skin: negative   Neurological: negative  Hematological/Lymphatic: negative  Psychological: negative    Physical Exam:      Vitals:    23 1005   Resp: 16     Patient is a 32 y.o. male in no acute distress and alert and oriented to person, place and time. NAD, A/o  Non labored respiration  Normal peripheral pulses  Skin- warm and dry  Psych- normal mood and affect      Assessment and Plan      1. Malignant neoplasm of testicle, unspecified laterality, unspecified whether descended or undescended ([facility] [address]    2. Seminoma of descended right testis [facility])             Plan:      No follow-ups on file. T2. CT scan today is negative. Check tumor markers and repeat CT and chest CT in 6 months. Todays CT reviewed and normal.  Need tumor markers today and repeat again in 6 months.             Dr. Babar Weinstein MD

## 2023-01-19 LAB
AFP-TUMOR MARKER: 2.6 UG/L
B-HCG SERPL-ACNC: < 1 IU/L (ref 0–3)

## 2023-02-10 ENCOUNTER — OFFICE VISIT (OUTPATIENT)
Dept: FAMILY MEDICINE CLINIC | Age: 28
End: 2023-02-10
Payer: COMMERCIAL

## 2023-02-10 VITALS
DIASTOLIC BLOOD PRESSURE: 72 MMHG | HEART RATE: 88 BPM | WEIGHT: 201 LBS | HEIGHT: 74 IN | OXYGEN SATURATION: 99 % | TEMPERATURE: 97.9 F | BODY MASS INDEX: 25.8 KG/M2 | RESPIRATION RATE: 16 BRPM | SYSTOLIC BLOOD PRESSURE: 148 MMHG

## 2023-02-10 DIAGNOSIS — A63.0 PENILE WART: ICD-10-CM

## 2023-02-10 DIAGNOSIS — Z76.89 ENCOUNTER TO ESTABLISH CARE: Primary | ICD-10-CM

## 2023-02-10 RX ORDER — IMIQUIMOD 12.5 MG/.25G
CREAM TOPICAL
Qty: 1 EACH | Refills: 2 | Status: SHIPPED | OUTPATIENT
Start: 2023-02-10 | End: 2023-02-17

## 2023-02-10 SDOH — ECONOMIC STABILITY: FOOD INSECURITY: WITHIN THE PAST 12 MONTHS, THE FOOD YOU BOUGHT JUST DIDN'T LAST AND YOU DIDN'T HAVE MONEY TO GET MORE.: NEVER TRUE

## 2023-02-10 SDOH — ECONOMIC STABILITY: HOUSING INSECURITY
IN THE LAST 12 MONTHS, WAS THERE A TIME WHEN YOU DID NOT HAVE A STEADY PLACE TO SLEEP OR SLEPT IN A SHELTER (INCLUDING NOW)?: NO

## 2023-02-10 SDOH — ECONOMIC STABILITY: INCOME INSECURITY: HOW HARD IS IT FOR YOU TO PAY FOR THE VERY BASICS LIKE FOOD, HOUSING, MEDICAL CARE, AND HEATING?: NOT HARD AT ALL

## 2023-02-10 SDOH — ECONOMIC STABILITY: FOOD INSECURITY: WITHIN THE PAST 12 MONTHS, YOU WORRIED THAT YOUR FOOD WOULD RUN OUT BEFORE YOU GOT MONEY TO BUY MORE.: NEVER TRUE

## 2023-02-10 ASSESSMENT — PATIENT HEALTH QUESTIONNAIRE - PHQ9
SUM OF ALL RESPONSES TO PHQ QUESTIONS 1-9: 0
2. FEELING DOWN, DEPRESSED OR HOPELESS: 0
SUM OF ALL RESPONSES TO PHQ QUESTIONS 1-9: 0
SUM OF ALL RESPONSES TO PHQ QUESTIONS 1-9: 0
1. LITTLE INTEREST OR PLEASURE IN DOING THINGS: 0
SUM OF ALL RESPONSES TO PHQ QUESTIONS 1-9: 0
SUM OF ALL RESPONSES TO PHQ9 QUESTIONS 1 & 2: 0

## 2023-02-10 ASSESSMENT — ENCOUNTER SYMPTOMS
COUGH: 0
CONSTIPATION: 0
EYE DISCHARGE: 0
DIARRHEA: 0
BLOOD IN STOOL: 0
SHORTNESS OF BREATH: 0
EYE PAIN: 0
CHEST TIGHTNESS: 0
NAUSEA: 0

## 2023-02-10 NOTE — PATIENT INSTRUCTIONS
Thank you   Thank you for trusting us with your healthcare needs. You may receive a survey regarding today's visit. It would help us out if you would take a few moments to provide your feedback. We value your input. Please bring in ALL medications BOTTLES, including inhalers, herbal supplements, over the counter, prescribed & non-prescribed medicine. The office would like actual medication bottles and a list.   Please note our OFFICE POLICIES:   Prior to getting your labs drawn, please check with your insurance company for benefits and eligibility of lab services. Often, insurance companies cover certain tests for preventative visits only. It is patient's responsibility to see what is covered. We are unable to change a diagnosis after the test has been performed. Lab orders will not be re-printed. Please hold onto your original lab orders and take them to your lab to be completed. If you no show your scheduled appointment three times, you will be dismissed from this practice. Reschedules must be completed 24 hours prior to your schedule appointment. If the list below has been completed, PLEASE FAX RECORDS TO OUR OFFICE @ [phone].  Once the records have been received we will update your records at our office:  Health Maintenance Due   Topic Date Due    COVID-19 Vaccine (1) Never done    Varicella vaccine (1 of 2 - 2-dose childhood series) Never done    Depression Screen  Never done    HIV screen  Never done    Hepatitis A vaccine (2 of 2 - 2-dose series) 05/30/2012    Hepatitis C screen  Never done    Flu vaccine (1) 08/01/2022

## 2023-02-10 NOTE — PROGRESS NOTES
[address] [facility] [address] [address]  Dept: [phone]  Loc: [phone]      Isrrael Humphries (:  1995) is a 32 y.o. male,Established patient, here for evaluation of the following chief complaint(s):  New Patient (STD check-up due to Left testicle issues)      ASSESSMENT/PLAN:  1. Encounter to establish care  -     Hepatitis B Surface Antibody; Future  -     Hepatitis B Surface Antigen; Future  -     Hepatitis C Antibody; Future  -     HIV Screen; Future  -     RPR; Future  2. Penile wart  - Acute penile lesions consistent with penile warts with cauliflower like appearance on the dorsal base of the penis as well as the right lateral penis shaft. Patient presents to the clinic with complaints of 2-3 penile lesions on penis for 2 weeks, not painful. Denies penile discharge or dysuria. Patient states he had a prior episode 2 years ago, got black and they fell off spontaneously. - Patient to start Aldara 5% cream applied 3x per week for maximum of 16 weeks.    - STI testing.  - Follow-up with urology as planned with history of seminoma status post orchiectomy  - Patient advised for his sex partner to avoid oral sex and to use protected sex throughout treatment.  -     Hepatitis B Surface Antibody; Future  -     Hepatitis B Surface Antigen; Future  -     Hepatitis C Antibody; Future  -     HIV Screen; Future  -     RPR; Future  -     imiquimod (ALDARA) 5 % cream; Apply topically three times a week., Disp-1 each, R-2, Normal    Return in about 1 month (around 3/10/2023) for follow up. SUBJECTIVE/OBJECTIVE:  HPI  Patient presents to establish care. Patient presents to the clinic with complaints of 2-3 penile lesions on penis for 2 weeks, not painful. Orchiectomy of right testicle last year for seminoma of right testicle, 2022, follows with Urology, Dr. Juancarlos Lance.  CT planned of Pelvis and chest.     Alcohol: 1 beer a week  Tobacco: non currently,  1 PPD for 7 years quit in 2021  Illicit sub: denies    Sexually: female 1 currently, 5-6 total, mostly used barrier protection.    Review of Systems  Constitutional: Negative for chills, diaphoresis and fever.   HENT: Negative for congestion and sore throat.    Eyes: Negative for redness and visual disturbance.   Respiratory: Negative for cough, chest tightness and shortness of breath.    Cardiovascular: Negative for palpitations and leg swelling.   Gastrointestinal: Negative for abdominal distention, abdominal pain and nausea.   Genitourinary: Positive for penile lesions  Musculoskeletal: Negative for back pain and neck pain.   Skin: Negative for color change and pallor.   Neurological: Negative for dizziness and light-headedness.   Psychiatric/Behavioral: Negative for agitation and confusion. The patient is not nervous/anxious    Physical Exam  General appearance: No apparent distress, appears stated age and cooperative.  HEENT: Pupils equal, round, and reactive to light. Conjunctivae/corneas clear.  Neck: Supple, with full range of motion. No jugular venous distention. Trachea midline.  Respiratory:  Normal respiratory effort. Clear to auscultation, bilaterally without Rales/Wheezes/Rhonchi.  Cardiovascular: Regular rate and rhythm with normal S1/S2 without murmurs, rubs or gallops.  Abdomen: Soft, non-tender, non-distended with normal bowel sounds.  Musculoskeletal: passive and active ROM x 4 extremities.  Skin: Small group of cauliflower-like lesions at the base of the dorsal penis as well as proximal right shaft.   Neurologic:  Neurovascularly intact without any focal sensory/motor deficits. Cranial nerves: II-XII intact, grossly non-focal.  Psychiatric: Alert and oriented, thought content appropriate, normal insight  Capillary Refill: Brisk,< 3 seconds   Peripheral Pulses: +2 palpable, equal bilaterally     Vitals:    02/10/23 0823 02/10/23 0827   BP: (!) 150/70 (!)  148/72   Site: Right Upper Arm Right Upper Arm   Position: Sitting Sitting   Cuff Size: Medium Adult Medium Adult   Pulse: 88    Resp: 16    Temp: 97.9 °F (36.6 °C)    TempSrc: Temporal    SpO2: 99%    Weight: 201 lb (91.2 kg)    Height: 6' 2\" (1.88 m)          An electronic signature was used to authenticate this note.     --Sera Helton MD

## 2023-02-10 NOTE — PROGRESS NOTES
Health Maintenance Due   Topic Date Due    COVID-19 Vaccine (1) Never done    Varicella vaccine (1 of 2 - 2-dose childhood series) Never done    Depression Screen  Never done    HIV screen  Never done    Hepatitis A vaccine (2 of 2 - 2-dose series) 05/30/2012    Hepatitis C screen  Never done    Flu vaccine (1) 08/01/2022

## 2023-02-10 NOTE — PROGRESS NOTES
Vj Davis is a 32 y.o. male who presents today for:  Chief Complaint   Patient presents with    New Patient     STD check-up due to Left testicle issues         HPI:   Vj Davis is 32 y.o. who presents today for follow up. Acute complaints discussed today include:    New Patient: Establish care for primary provider. Penile Lesions: Pt reports having 2-3 penile lesions on the base of the penis. Noticed them 2 weeks prior and reports having experienced  similar lesions 2 years ago. Previously the lesions  spontaneously aborted after turning a black color. The lesions are not painful  or pruritic, no bleeding or drainage observed. Pt uses condoms with singular female partner inconsistently. Denies penile discharge or dysuria. Right Orchiectomy: Seminoma of descended right testis. Orchiectomy performed Feb 2022 by urology, Dr. Sherri Stephens.  Follow up with Dr. Rivas Lowe office and was last seen in [facility] 's office on 1/18/2023          Objective:     Vitals:    02/10/23 0823 02/10/23 0827   BP: (!) 150/70 (!) 148/72   Site: Right Upper Arm Right Upper Arm   Position: Sitting Sitting   Cuff Size: Medium Adult Medium Adult   Pulse: 88    Resp: 16    Temp: 97.9 °F (36.6 °C)    TempSrc: Temporal    SpO2: 99%    Weight: 201 lb (91.2 kg)    Height: 6' 2\" (1.88 m)        Wt Readings from Last 3 Encounters:   02/10/23 201 lb (91.2 kg)   01/18/23 205 lb (93 kg)   08/15/22 207 lb (93.9 kg)       BP Readings from Last 3 Encounters:   02/10/23 (!) 148/72   08/15/22 128/82   03/09/22 (!) 144/87       Lab Results   Component Value Date    WBC 9.6 02/21/2022    HGB 16.7 02/21/2022    HCT 49.3 02/21/2022    MCV 92.5 02/21/2022     02/21/2022     Lab Results   Component Value Date     02/21/2022    K 4.5 02/21/2022     02/21/2022    CO2 23 02/21/2022    BUN 10 02/21/2022    CREATININE 0.8 02/21/2022    GLUCOSE 90 02/21/2022    CALCIUM 9.6 02/21/2022    PROT 7.6 02/21/2022    LABALBU 4.7 02/21/2022 BILITOT 0.5 02/21/2022    ALKPHOS 119 02/21/2022    AST 19 02/21/2022    ALT 20 02/21/2022    LABGLOM >90 02/21/2022     No results found for: TSH, W5LRVWZ, K1RKFJJ, THYROIDAB, FT3, T4FREE  No results found for: LABA1C  No results found for: EAG  No results found for: CHOL  No results found for: TRIG  No results found for: HDL  No results found for: LDLCHOLESTEROL, LDLCALC    No results found for: LABMICR, [id number]    Review of Systems   Constitutional:  Negative for appetite change, chills, fatigue and fever. HENT:  Negative for ear discharge, ear pain and tinnitus. Eyes:  Negative for pain and discharge. Respiratory:  Negative for cough, chest tightness and shortness of breath. Cardiovascular:  Negative for chest pain and palpitations. Gastrointestinal:  Negative for blood in stool, constipation, diarrhea and nausea. Genitourinary:  Positive for genital sores. Negative for dysuria, penile discharge, penile pain and testicular pain. Physical Exam  Constitutional:       Appearance: Normal appearance. HENT:      Head: Normocephalic. Cardiovascular:      Rate and Rhythm: Normal rate and regular rhythm. Heart sounds: No murmur heard. No gallop. Pulmonary:      Effort: Pulmonary effort is normal. No respiratory distress. Breath sounds: Normal breath sounds. No wheezing or rales. Abdominal:      General: Abdomen is flat. There is no distension. Palpations: Abdomen is soft. Tenderness: There is no abdominal tenderness. There is no guarding. Genitourinary:     Penis: Lesions (3 clusted leasions at dorsal base of penis. Additional lesion on right side of shaft) present. Musculoskeletal:      Cervical back: Normal range of motion and neck supple. Neurological:      Mental Status: He is alert.        Immunization History   Administered Date(s) Administered    DTaP 1995, 1995, 04/16/1996, 12/19/1996, 04/11/2000    Hepatitis A 11/30/2011    Hepatitis B (Engerix-B) 1995, 1995, 04/16/1996    Hib PRP-OMP (PedvaxHIB) 1995, 1995, 04/16/1996, 10/15/1996    Influenza Virus Vaccine 02/01/2016    MMR 07/16/1996, 04/11/2000    Meningococcal MCV4P (Menactra) 11/30/2011    Polio IPV (IPOL) 1995, 1995, 04/16/1996, 04/11/2000    Tdap (Boostrix, Adacel) 11/30/2011, 01/07/2020       Health Maintenance Due   Topic Date Due    COVID-19 Vaccine (1) Never done    Varicella vaccine (1 of 2 - 2-dose childhood series) Never done    Depression Screen  Never done    HIV screen  Never done    Hepatitis A vaccine (2 of 2 - 2-dose series) 05/30/2012    Hepatitis C screen  Never done    Flu vaccine (1) 08/01/2022       Food Insecurity: No Food Insecurity    Worried About Running Out of Food in the Last Year: Never true    Ran Out of Food in the Last Year: Never true       Assessment / Plan:      Diagnosis Orders   1. Penile wart        2. Encounter to establish care          Begin  Aldara 5% (Imiquimod) cream applied 3x/week nightly for maximum of 16 weeks. Patient instructed to leave on 8 hours and then rinse off. Will Follow up in one month for progression of treatment. Orders placed for HIV, Hep B,C, and RPR. Encourage Gardasil 9  for patient and partner. Advised to a void oral sex and use barrier protections for sexual encounters  through treatment. Continue to Follow-up with Dr. Ghazala Lopez [facility] for testicular cancer management. Can be seen annually after next 1 month follow-up or sooner if needed. Return in about 1 month (around 3/10/2023) for follow up. Medications Prescribed:  No orders of the defined types were placed in this encounter. Future Appointments   Date Time Provider Arline Robles   7/12/2023  8:40 AM STR CT IMAGING RM1  [facility] EXP STR Radiolog   7/19/2023  8:00 AM Savanah Dunn MD [address] [facility] [place]       Patient given educational materials - see patient instructions.   Discussed use, benefit, and sideeffects of prescribed medications. All patient questions answered. Pt voiced understanding. Reviewed health maintenance. Instructed to continue current medications, diet and exercise. Patient agreed with treatment plan. Follow up as directed. Plan discussed with attending physician.      Electronically signed by Marly Birmingham on 2/10/2023 at 9:51 AM

## 2023-02-10 NOTE — PROGRESS NOTES
[address] [place] [address]  Dept: [phone]  Dept Fax: [phone]    JAYSHREE Benitezasador is a 32 y.o.male    Chief Complaint   Patient presents with    New Patient     STD check-up due to Left testicle issues       Chief complaint, [place], and all pertinent details of the case reviewed with the resident. Please see resident's note for specific details discussed at today's visit. Patient Active Problem List   Diagnosis    Nicotine dependence    Malignant neoplasm of descended left testis [facility])       Current Outpatient Medications   Medication Sig Dispense Refill    acetaminophen (TYLENOL) 325 MG tablet Take 650 mg by mouth every 6 hours as needed for Pain       No current facility-administered medications for this visit. Review of Systems per resident physician    OBJECTIVE     BP (!) 148/72 (Site: Right Upper Arm, Position: Sitting, Cuff Size: Medium Adult)   Pulse 88   Temp 97.9 °F (36.6 °C) (Temporal)   Resp 16   Ht 6' 2\" (1.88 m)   Wt 201 lb (91.2 kg)   SpO2 99%   BMI 25.81 kg/m²   BP Readings from Last 3 Encounters:   02/10/23 (!) 148/72   08/15/22 128/82   03/09/22 (!) 144/87       Wt Readings from Last 3 Encounters:   02/10/23 201 lb (91.2 kg)   01/18/23 205 lb (93 kg)   08/15/22 207 lb (93.9 kg)     Body mass index is 25.81 kg/m². Physical Exam  Skin:     Comments: Small group of cauliflower like lesions at dorsal base of penis, as well as right proximal shaft. No results found for this visit on 02/10/23. No results found for: LABA1C    No results found for: CHOL, TRIG, HDL, LDLCALC, LDLDIRECT    The ASCVD Risk score ([place]ain[place] DK, et al., 2019) failed to calculate for the following reasons:     The 2019 ASCVD risk score is only valid for ages 36 to 78    Lab Results   Component Value Date     02/21/2022    K 4.5 02/21/2022     02/21/2022    CO2 23 02/21/2022    BUN 10 02/21/2022 CREATININE 0.8 02/21/2022    GLUCOSE 90 02/21/2022    CALCIUM 9.6 02/21/2022    PROT 7.6 02/21/2022    LABALBU 4.7 02/21/2022    BILITOT 0.5 02/21/2022    ALKPHOS 119 02/21/2022    AST 19 02/21/2022    ALT 20 02/21/2022    LABGLOM >90 02/21/2022     CrCl cannot be calculated (Patient's most recent lab result is older than the maximum 180 days allowed. ).     No results found for: Walker Mendoza    No results found for: TSH, S2BUGLZ, K5GAUXG, THYROIDAB, FT3, T4FREE    Lab Results   Component Value Date    WBC 9.6 02/21/2022    HGB 16.7 02/21/2022    HCT 49.3 02/21/2022    MCV 92.5 02/21/2022     02/21/2022       No results found for: PSA, PSADIA    Immunization History   Administered Date(s) Administered    DTaP 1995, 1995, 04/16/1996, 12/19/1996, 04/11/2000    Hepatitis A 11/30/2011    Hepatitis B (Engerix-B) 1995, 1995, 04/16/1996    Hib PRP-OMP (PedvaxHIB) 1995, 1995, 04/16/1996, 10/15/1996    Influenza Virus Vaccine 02/01/2016    MMR 07/16/1996, 04/11/2000    Meningococcal MCV4P (Menactra) 11/30/2011    Polio IPV (IPOL) 1995, 1995, 04/16/1996, 04/11/2000    Tdap (Boostrix, Adacel) 11/30/2011, 01/07/2020       Health Maintenance   Topic Date Due    COVID-19 Vaccine (1) Never done    Varicella vaccine (1 of 2 - 2-dose childhood series) Never done    HIV screen  Never done    Hepatitis A vaccine (2 of 2 - 2-dose series) 05/30/2012    Hepatitis C screen  Never done    Flu vaccine (1) 08/01/2022    Depression Screen  02/10/2024    DTaP/Tdap/Td vaccine (8 - Td or Tdap) 01/07/2030    Hib vaccine  Completed    Meningococcal (ACWY) vaccine  Completed    Pneumococcal 0-64 years Vaccine  Aged Out            Future Appointments   Date Time Provider Arline Tarangoi   3/14/2023  8:00 AM Ian West MD [facility] [place] [address]   7/12/2023  8:40 AM STR CT IMAGING RM1  [facility] EXP STR Radiolog   7/19/2023  8:00 AM Jerlean Prader, MD [address] [place] [address] ASSESSMENT       Diagnosis Orders   1. Encounter to establish care  Hepatitis B Surface Antibody    Hepatitis B Surface Antigen    Hepatitis C Antibody    HIV Screen    RPR      2. Penile wart  Hepatitis B Surface Antibody    Hepatitis B Surface Antigen    Hepatitis C Antibody    HIV Screen    RPR    imiquimod (ALDARA) 5 % cream          PLAN      After discussion with pt and resident provider, we agreed on plan as follows:      Start Aldara 5% cream applied 3x/week nightly for maximum of 16 weeks. Patient instructed to leave on 8 hours and then rinse off. Check STI testing  Follow-up with urology as planned for history of testicular cancer  Encourage Gardasil 9  Avoid oral sex and use protected sex through treatment. Work on diet, exercise, and weight loss  Preventative care gaps next visit  Follow-up in 1 month          Attending Physician Statement  I have discussed the case, including pertinent history and exam findings with the resident. I also have seen the patient and performed key portions of the examination. I agree with the documented assessment and plan as documented by the resident.   GC modifier added  to this encounter     Electronically signed by Enmanuel Zelaya MD on 2/26/2023 at 11:17 AM

## 2023-07-12 ENCOUNTER — HOSPITAL ENCOUNTER (OUTPATIENT)
Dept: CT IMAGING | Age: 28
Discharge: HOME | End: 2023-07-12
Payer: COMMERCIAL

## 2023-07-12 DIAGNOSIS — C62.90 MALIGNANT NEOPLASM OF TESTICLE, UNSPECIFIED LATERALITY, UNSPECIFIED WHETHER DESCENDED OR UNDESCENDED (HCC): ICD-10-CM

## 2023-07-12 PROCEDURE — 71260 CT THORAX DX C+: CPT

## 2023-07-12 PROCEDURE — 6360000004 HC RX CONTRAST MEDICATION: Performed by: UROLOGY

## 2023-07-12 PROCEDURE — 74177 CT ABD & PELVIS W/CONTRAST: CPT

## 2023-07-12 RX ADMIN — IOPAMIDOL 80 ML: 755 INJECTION, SOLUTION INTRAVENOUS at 08:12

## 2023-07-19 ENCOUNTER — OFFICE VISIT (OUTPATIENT)
Dept: UROLOGY | Age: 28
End: 2023-07-19
Payer: COMMERCIAL

## 2023-07-19 ENCOUNTER — HOSPITAL ENCOUNTER (OUTPATIENT)
Age: 28
Discharge: HOME | End: 2023-07-19
Payer: COMMERCIAL

## 2023-07-19 VITALS — WEIGHT: 208 LBS | RESPIRATION RATE: 18 BRPM | BODY MASS INDEX: 26.69 KG/M2 | HEIGHT: 74 IN

## 2023-07-19 DIAGNOSIS — R91.1 LUNG NODULE: ICD-10-CM

## 2023-07-19 DIAGNOSIS — C62.11 SEMINOMA OF DESCENDED RIGHT TESTIS (HCC): ICD-10-CM

## 2023-07-19 DIAGNOSIS — C62.90 MALIGNANT NEOPLASM OF TESTICLE, UNSPECIFIED LATERALITY, UNSPECIFIED WHETHER DESCENDED OR UNDESCENDED (HCC): ICD-10-CM

## 2023-07-19 DIAGNOSIS — C62.90 MALIGNANT NEOPLASM OF TESTICLE, UNSPECIFIED LATERALITY, UNSPECIFIED WHETHER DESCENDED OR UNDESCENDED (HCC): Primary | ICD-10-CM

## 2023-07-19 LAB
AFP SERPL-MCNC: 3.5 UG/L
LDH SERPL L TO P-CCNC: 174 U/L (ref 100–190)

## 2023-07-19 PROCEDURE — 84702 CHORIONIC GONADOTROPIN TEST: CPT

## 2023-07-19 PROCEDURE — 99214 OFFICE O/P EST MOD 30 MIN: CPT | Performed by: UROLOGY

## 2023-07-19 PROCEDURE — 36415 COLL VENOUS BLD VENIPUNCTURE: CPT

## 2023-07-19 PROCEDURE — 82105 ALPHA-FETOPROTEIN SERUM: CPT

## 2023-07-19 PROCEDURE — 83615 LACTATE (LD) (LDH) ENZYME: CPT

## 2023-07-19 NOTE — PROGRESS NOTES
Cefprozil  Social History     Tobacco Use   Smoking Status Former    Packs/day: 0.50    Years: 2.00    Pack years: 1.00    Types: Cigarettes    Quit date:     Years since quittin.5   Smokeless Tobacco Never       Social History     Substance and Sexual Activity   Alcohol Use Yes    Alcohol/week: 0.0 standard drinks    Comment: occasional       REVIEW OF SYSTEMS:  Constitutional: negative  Eyes: negative  Respiratory: negative  Cardiovascular: negative  Gastrointestinal: negative  Musculoskeletal: negative  Genitourinary: negative except for what is in HPI  Skin: negative   Neurological: negative  Hematological/Lymphatic: negative  Psychological: negative    Physical Exam:      Vitals:    23 0743   Resp: 18     Patient is a 29 y.o. male in no acute distress and alert and oriented to person, place and time. NAD, A/o  Non labored respiration  Normal peripheral pulses  Skin- warm and dry  Psych- normal mood and affect      Assessment and Plan      1. Malignant neoplasm of testicle, unspecified laterality, unspecified whether descended or undescended ([address])    2. Seminoma of descended right testis ([address])    3. Lung nodule               Plan:      No follow-ups on file. T2. CT scan today is negative. Check tumor markers and repeat CT and chest CT in 6 months. Todays CT reviewed and normal.  Need tumor markers today and repeat again in 6 months.             Dr. Briseyda Chin MD

## 2023-07-20 LAB — B-HCG SERPL-ACNC: < 1 IU/L (ref 0–3)

## 2023-07-31 ENCOUNTER — TELEPHONE (OUTPATIENT)
Dept: UROLOGY | Age: 28
End: 2023-07-31

## 2023-07-31 NOTE — TELEPHONE ENCOUNTER
Patient scheduled for CT ABD PELV W IV AND CT CHEST  at [facility] on 1/12/2024 ARRIVAL OF 250PM FOR A 320PM SCAN. NPO 4 HOUR PRIOR TO SCAN.     Order mailed with instructions  to the patient

## 2023-08-01 ENCOUNTER — HOSPITAL ENCOUNTER (EMERGENCY)
Age: 28
Discharge: HOME | End: 2023-08-01
Payer: COMMERCIAL

## 2023-08-01 VITALS
DIASTOLIC BLOOD PRESSURE: 91 MMHG | WEIGHT: 208 LBS | TEMPERATURE: 97.8 F | HEIGHT: 73 IN | BODY MASS INDEX: 27.57 KG/M2 | RESPIRATION RATE: 16 BRPM | SYSTOLIC BLOOD PRESSURE: 136 MMHG | HEART RATE: 79 BPM

## 2023-08-01 DIAGNOSIS — Z20.2 POSSIBLE EXPOSURE TO STD: ICD-10-CM

## 2023-08-01 DIAGNOSIS — R30.0 DYSURIA: Primary | ICD-10-CM

## 2023-08-01 LAB
BILIRUB UR STRIP.AUTO-MCNC: NEGATIVE MG/DL
CHARACTER UR: CLEAR
COLOR: YELLOW
GLUCOSE UR QL STRIP.AUTO: NEGATIVE MG/DL
KETONES UR QL STRIP.AUTO: NEGATIVE
NITRITE UR QL STRIP.AUTO: NEGATIVE
PH UR STRIP.AUTO: 6 [PH] (ref 5–9)
PROT UR STRIP.AUTO-MCNC: ABNORMAL MG/DL
RBC #/AREA URNS HPF: NEGATIVE /[HPF]
SP GR UR STRIP.AUTO: 1.02 (ref 1–1.03)
UROBILINOGEN, URINE: 0.2 EU/DL (ref 0.2–1)
WBC #/AREA URNS HPF: ABNORMAL /[HPF]

## 2023-08-01 PROCEDURE — 87491 CHLMYD TRACH DNA AMP PROBE: CPT

## 2023-08-01 PROCEDURE — 87086 URINE CULTURE/COLONY COUNT: CPT

## 2023-08-01 PROCEDURE — 87591 N.GONORRHOEAE DNA AMP PROB: CPT

## 2023-08-01 PROCEDURE — 99214 OFFICE O/P EST MOD 30 MIN: CPT

## 2023-08-01 PROCEDURE — 99213 OFFICE O/P EST LOW 20 MIN: CPT

## 2023-08-01 PROCEDURE — 6370000000 HC RX 637 (ALT 250 FOR IP)

## 2023-08-01 PROCEDURE — 81003 URINALYSIS AUTO W/O SCOPE: CPT

## 2023-08-01 RX ORDER — AZITHROMYCIN 250 MG/1
1000 TABLET, FILM COATED ORAL ONCE
Status: COMPLETED | OUTPATIENT
Start: 2023-08-01 | End: 2023-08-01

## 2023-08-01 RX ADMIN — AZITHROMYCIN DIHYDRATE 1000 MG: 250 TABLET ORAL at 09:58

## 2023-08-01 NOTE — ED PROVIDER NOTES
[address]  Urgent Care Encounter       CHIEF COMPLAINT       Chief Complaint   Patient presents with    Dysuria     Burning with urination concern for std       Nurses Notes reviewed and I agree except as noted in the HPI. HISTORY OF PRESENT ILLNESS   Gabe Carlos is a 29 y.o. male who presents with complaints of burning with urination and feeling like a needle is going through his penis. Patient reports that a week ago he had unprotected sex. Patient unsure if the person had any STD's. Patient denies any penile discharge, or hematuria. Patient does report that he did have a testicle taken out a few years ago and is unsure when he is having testicular pain or if it is his anxiety from post surgery. The history is provided by the patient. REVIEW OF SYSTEMS     Review of Systems   Genitourinary:  Positive for dysuria and penile pain. Negative for decreased urine volume, flank pain, frequency, penile discharge, penile swelling, scrotal swelling, testicular pain and urgency. All other systems reviewed and are negative. PAST MEDICAL HISTORY         Diagnosis Date    Nicotine dependence     Testicular cancer ([address])        SURGICALHISTORY     Patient  has a past surgical history that includes Testicle removal (Right, 2/23/2022). CURRENT MEDICATIONS       Discharge Medication List as of 8/1/2023  9:57 AM          ALLERGIES     Patient is is allergic to cefprozil.     Patients   Immunization History   Administered Date(s) Administered    DTaP 1995, 1995, 04/16/1996, 12/19/1996, 04/11/2000    Hepatitis A 11/30/2011    Hepatitis B (Engerix-B) 1995, 1995, 04/16/1996    Hib PRP-OMP, PEDVAXHIB, (age 4m-8y, Adlt Risk), IM, 0.5mL 1995, 1995, 04/16/1996, 10/15/1996    Influenza Virus Vaccine 02/01/2016    MMR, PRIORIX, M-M-R II, (age 12m+), [place], 0.5mL 07/16/1996, 04/11/2000    Meningococcal ACWY, MENACTRA (MenACWY-D), (age 10m-48y), IM, 0.5mL 11/30/2011

## 2023-08-01 NOTE — DISCHARGE INSTRUCTIONS
No sex  Will be called with results  ER if blood in urine, inability to urinate, or severe abdominal pain

## 2023-08-02 LAB
CHLAMYDIA TRACHOMATIS BY RT-PCR: DETECTED
CT/NG SOURCE: ABNORMAL
NEISSERIA GONORRHOEAE BY RT-PCR: NOT DETECTED

## 2023-08-03 LAB — BACTERIA UR CULT: NORMAL

## 2023-08-28 ENCOUNTER — HOSPITAL ENCOUNTER (EMERGENCY)
Age: 28
Discharge: HOME | End: 2023-08-28
Payer: COMMERCIAL

## 2023-08-28 ENCOUNTER — APPOINTMENT (OUTPATIENT)
Dept: GENERAL RADIOLOGY | Age: 28
End: 2023-08-28
Payer: COMMERCIAL

## 2023-08-28 VITALS
DIASTOLIC BLOOD PRESSURE: 83 MMHG | OXYGEN SATURATION: 98 % | TEMPERATURE: 98.3 F | HEIGHT: 73 IN | BODY MASS INDEX: 26.51 KG/M2 | RESPIRATION RATE: 16 BRPM | HEART RATE: 95 BPM | SYSTOLIC BLOOD PRESSURE: 141 MMHG | WEIGHT: 200 LBS

## 2023-08-28 DIAGNOSIS — S50.811A ABRASION OF RIGHT FOREARM, INITIAL ENCOUNTER: ICD-10-CM

## 2023-08-28 DIAGNOSIS — S60.221A CONTUSION OF RIGHT HAND, INITIAL ENCOUNTER: Primary | ICD-10-CM

## 2023-08-28 PROCEDURE — 73080 X-RAY EXAM OF ELBOW: CPT

## 2023-08-28 PROCEDURE — 73130 X-RAY EXAM OF HAND: CPT

## 2023-08-28 PROCEDURE — 99213 OFFICE O/P EST LOW 20 MIN: CPT

## 2023-08-28 PROCEDURE — 99213 OFFICE O/P EST LOW 20 MIN: CPT | Performed by: NURSE PRACTITIONER

## 2023-08-28 ASSESSMENT — ENCOUNTER SYMPTOMS
EYES NEGATIVE: 1
ABDOMINAL PAIN: 0
ALLERGIC/IMMUNOLOGIC NEGATIVE: 1
SHORTNESS OF BREATH: 0
SORE THROAT: 0

## 2023-08-28 NOTE — DISCHARGE INSTRUCTIONS
Continue rest, ice, compression, elevation of the right hand. Follow-up with your primary care provider as needed. Take acetaminophen and ibuprofen as needed for pain.
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
## 2023-10-25 ENCOUNTER — OFFICE VISIT (OUTPATIENT)
Dept: FAMILY MEDICINE CLINIC | Age: 28
End: 2023-10-25
Payer: COMMERCIAL

## 2023-10-25 VITALS
BODY MASS INDEX: 25.82 KG/M2 | DIASTOLIC BLOOD PRESSURE: 82 MMHG | WEIGHT: 194.8 LBS | HEIGHT: 73 IN | HEART RATE: 65 BPM | OXYGEN SATURATION: 98 % | SYSTOLIC BLOOD PRESSURE: 126 MMHG | RESPIRATION RATE: 16 BRPM | TEMPERATURE: 97.8 F

## 2023-10-25 DIAGNOSIS — N34.1 URETHRITIS, NONGONOCOCCAL, SEXUALLY TRANSMITTED: ICD-10-CM

## 2023-10-25 DIAGNOSIS — R30.0 DYSURIA: Primary | ICD-10-CM

## 2023-10-25 DIAGNOSIS — A64 URETHRITIS, NONGONOCOCCAL, SEXUALLY TRANSMITTED: ICD-10-CM

## 2023-10-25 LAB
BILIRUB UR QL STRIP.AUTO: NEGATIVE
CHARACTER UR: CLEAR
COLOR: YELLOW
GLUCOSE UR QL STRIP.AUTO: NEGATIVE MG/DL
HGB UR QL STRIP.AUTO: NEGATIVE
KETONES UR QL STRIP.AUTO: NEGATIVE
NITRITE UR QL STRIP: NEGATIVE
PH UR STRIP.AUTO: 6 [PH] (ref 5–9)
PROT UR STRIP.AUTO-MCNC: NEGATIVE MG/DL
SP GR UR REFRACT.AUTO: 1.01 (ref 1–1.03)
UROBILINOGEN, URINE: 0.2 EU/DL (ref 0–1)
WBC #/AREA URNS HPF: NEGATIVE /[HPF]

## 2023-10-25 PROCEDURE — 99213 OFFICE O/P EST LOW 20 MIN: CPT | Performed by: FAMILY MEDICINE

## 2023-10-25 RX ORDER — DOXYCYCLINE HYCLATE 100 MG
100 TABLET ORAL 2 TIMES DAILY
Qty: 14 TABLET | Refills: 0 | Status: SHIPPED | OUTPATIENT
Start: 2023-10-25 | End: 2023-11-01

## 2023-10-25 NOTE — PATIENT INSTRUCTIONS
Make sure you  hear from us by 1 week from now. You might need moxifloxacin 400 mg for 1 week. Partner needs  treatment. Use condoms  or abstain.

## 2023-10-28 LAB
C TRACH RRNA SPEC QL NAA+PROBE: NEGATIVE
N GONORRHOEA RRNA SPEC QL NAA+PROBE: NEGATIVE
SPECIMEN SOURCE: NORMAL

## 2023-10-31 ENCOUNTER — TELEPHONE (OUTPATIENT)
Dept: FAMILY MEDICINE CLINIC | Age: 28
End: 2023-10-31

## 2023-10-31 DIAGNOSIS — R30.0 DYSURIA: Primary | ICD-10-CM

## 2023-10-31 NOTE — TELEPHONE ENCOUNTER
Called pt and left message. Tld results were negative, but that means we might need to consider if need to cover mycoplasma- which would require moxifloxin 400 mg q day for 1 week. Encouraged to contact us/come back in. Also left a [employer] message.

## 2023-11-02 ENCOUNTER — OFFICE VISIT (OUTPATIENT)
Dept: FAMILY MEDICINE CLINIC | Age: 28
End: 2023-11-02

## 2023-11-02 VITALS
HEART RATE: 65 BPM | SYSTOLIC BLOOD PRESSURE: 140 MMHG | WEIGHT: 197.6 LBS | DIASTOLIC BLOOD PRESSURE: 82 MMHG | RESPIRATION RATE: 21 BRPM | BODY MASS INDEX: 26.19 KG/M2 | HEIGHT: 73 IN | OXYGEN SATURATION: 98 % | TEMPERATURE: 98.3 F

## 2023-11-02 DIAGNOSIS — R30.0 DYSURIA: Primary | ICD-10-CM

## 2023-11-02 LAB
BILIRUBIN URINE: NEGATIVE
BILIRUBIN, POC: NEGATIVE
BLOOD URINE, POC: NEGATIVE
BLOOD URINE, POC: NEGATIVE
CHARACTER, URINE: CLEAR
CHLAMYDIA TRACHOMATIS BY RT-PCR: NOT DETECTED
CLARITY, POC: NORMAL
COLOR, POC: NORMAL
COLOR, URINE: YELLOW
CT/NG SOURCE: NORMAL
GLUCOSE URINE, POC: NEGATIVE
GLUCOSE URINE: NEGATIVE MG/DL
KETONES, POC: NEGATIVE
KETONES, URINE: NEGATIVE
LEUKOCYTE CLUMPS, URINE: NEGATIVE
LEUKOCYTE EST, POC: NEGATIVE
NEISSERIA GONORRHOEAE BY RT-PCR: NOT DETECTED
NITRITE, POC: NEGATIVE
NITRITE, URINE: NEGATIVE
PH, POC: 6
PH, URINE: 6 (ref 5–9)
PROTEIN, POC: NEGATIVE
PROTEIN, URINE: NEGATIVE MG/DL
SPECIFIC GRAVITY, POC: >=1.03
SPECIFIC GRAVITY, URINE: >= 1.03 (ref 1–1.03)
UROBILINOGEN, POC: NORMAL
UROBILINOGEN, URINE: 0.2 EU/DL (ref 0–1)

## 2023-11-02 RX ORDER — AZITHROMYCIN 250 MG/1
250 TABLET, FILM COATED ORAL SEE ADMIN INSTRUCTIONS
Qty: 6 TABLET | Refills: 0 | Status: CANCELLED | OUTPATIENT
Start: 2023-11-02 | End: 2023-11-07

## 2023-11-04 LAB — BACTERIA UR CULT: NORMAL

## 2023-11-06 LAB
SPEC CONTAINER SPEC: NORMAL
SPECIMEN SOURCE: NORMAL
T VAGINALIS RRNA SPEC QL NAA+PROBE: NEGATIVE

## 2023-11-07 ASSESSMENT — ENCOUNTER SYMPTOMS
BLOOD IN STOOL: 0
ABDOMINAL PAIN: 0
DIARRHEA: 0
NAUSEA: 0
VOMITING: 0
SHORTNESS OF BREATH: 0

## 2023-11-10 ENCOUNTER — TELEPHONE (OUTPATIENT)
Dept: FAMILY MEDICINE CLINIC | Age: 28
End: 2023-11-10

## 2023-11-10 NOTE — TELEPHONE ENCOUNTER
----- Message from [facility] sent at 11/10/2023  8:04 AM EST -----  Urine so far is not showing any growth.  Awaiting Mycoplasma result which is a sendout

## 2023-11-10 NOTE — TELEPHONE ENCOUNTER
Attempted to contact pt, could not leave vm.  Pt notified through [facility].

## 2023-11-12 LAB — MISC. #1 REFERENCE GROUP TEST: NORMAL

## 2023-11-13 ENCOUNTER — TELEPHONE (OUTPATIENT)
Dept: FAMILY MEDICINE CLINIC | Age: 28
End: 2023-11-13

## 2023-12-25 ENCOUNTER — HOSPITAL ENCOUNTER (EMERGENCY)
Age: 28
Discharge: HOME | End: 2023-12-25
Payer: COMMERCIAL

## 2023-12-25 VITALS
RESPIRATION RATE: 18 BRPM | SYSTOLIC BLOOD PRESSURE: 143 MMHG | DIASTOLIC BLOOD PRESSURE: 75 MMHG | BODY MASS INDEX: 25.18 KG/M2 | WEIGHT: 190 LBS | HEART RATE: 73 BPM | OXYGEN SATURATION: 95 % | HEIGHT: 73 IN | TEMPERATURE: 97.8 F

## 2023-12-25 DIAGNOSIS — S61.215A LACERATION OF LEFT RING FINGER WITHOUT FOREIGN BODY WITHOUT DAMAGE TO NAIL, INITIAL ENCOUNTER: Primary | ICD-10-CM

## 2023-12-25 PROCEDURE — 99282 EMERGENCY DEPT VISIT SF MDM: CPT

## 2023-12-25 NOTE — ED NOTES
Pt to ED with c/o left ring finger laceration. Pt states cutting potatoes 45 minutes ago and cutting finger. Finger wrapped, bleeding controlled.

## 2024-02-07 ENCOUNTER — HOSPITAL ENCOUNTER (OUTPATIENT)
Age: 29
Discharge: HOME | End: 2024-02-07
Payer: COMMERCIAL

## 2024-02-07 ENCOUNTER — OFFICE VISIT (OUTPATIENT)
Dept: UROLOGY | Age: 29
End: 2024-02-07
Payer: COMMERCIAL

## 2024-02-07 VITALS
DIASTOLIC BLOOD PRESSURE: 82 MMHG | BODY MASS INDEX: 25.25 KG/M2 | WEIGHT: 190.5 LBS | SYSTOLIC BLOOD PRESSURE: 138 MMHG | HEIGHT: 73 IN

## 2024-02-07 DIAGNOSIS — R30.0 DYSURIA: ICD-10-CM

## 2024-02-07 DIAGNOSIS — C62.11 SEMINOMA OF DESCENDED RIGHT TESTIS (HCC): ICD-10-CM

## 2024-02-07 DIAGNOSIS — C62.90 MALIGNANT NEOPLASM OF TESTICLE, UNSPECIFIED LATERALITY, UNSPECIFIED WHETHER DESCENDED OR UNDESCENDED (HCC): ICD-10-CM

## 2024-02-07 DIAGNOSIS — R91.1 LUNG NODULE: ICD-10-CM

## 2024-02-07 DIAGNOSIS — C62.90 MALIGNANT NEOPLASM OF TESTICLE, UNSPECIFIED LATERALITY, UNSPECIFIED WHETHER DESCENDED OR UNDESCENDED (HCC): Primary | ICD-10-CM

## 2024-02-07 LAB
CHLAMYDIA TRACHOMATIS BY RT-PCR: NOT DETECTED
CT/NG SOURCE: NORMAL
LDH SERPL L TO P-CCNC: 198 U/L (ref 100–190)
NEISSERIA GONORRHOEAE BY RT-PCR: NOT DETECTED

## 2024-02-07 PROCEDURE — 83615 LACTATE (LD) (LDH) ENZYME: CPT

## 2024-02-07 PROCEDURE — 87491 CHLMYD TRACH DNA AMP PROBE: CPT

## 2024-02-07 PROCEDURE — 87591 N.GONORRHOEAE DNA AMP PROB: CPT

## 2024-02-07 PROCEDURE — 82105 ALPHA-FETOPROTEIN SERUM: CPT

## 2024-02-07 PROCEDURE — 87536 HIV-1 QUANT&REVRSE TRNSCRPJ: CPT

## 2024-02-07 PROCEDURE — 99214 OFFICE O/P EST MOD 30 MIN: CPT | Performed by: UROLOGY

## 2024-02-07 PROCEDURE — 84702 CHORIONIC GONADOTROPIN TEST: CPT

## 2024-02-07 PROCEDURE — 87661 TRICHOMONAS VAGINALIS AMPLIF: CPT

## 2024-02-07 PROCEDURE — 36415 COLL VENOUS BLD VENIPUNCTURE: CPT

## 2024-02-07 RX ORDER — DOXYCYCLINE HYCLATE 100 MG
100 TABLET ORAL 2 TIMES DAILY
Qty: 28 TABLET | Refills: 0 | Status: SHIPPED | OUTPATIENT
Start: 2024-02-07 | End: 2024-02-21

## 2024-02-07 RX ORDER — AZITHROMYCIN 500 MG/1
1000 TABLET, FILM COATED ORAL SEE ADMIN INSTRUCTIONS
Qty: 2 TABLET | Refills: 0 | Status: SHIPPED | OUTPATIENT
Start: 2024-02-07 | End: 2024-02-08

## 2024-02-07 NOTE — PROGRESS NOTES
Smoking Status Former    Current packs/day: 0.00    Average packs/day: 0.5 packs/day for 2.0 years (1.0 ttl pk-yrs)    Types: Cigarettes    Start date: 2019    Quit date: 2021    Years since quitting: 3.1   Smokeless Tobacco Never       Social History     Substance and Sexual Activity   Alcohol Use Yes    Alcohol/week: 0.0 standard drinks of alcohol    Comment: occasional       REVIEW OF SYSTEMS:  Constitutional: negative  Eyes: negative  Respiratory: negative  Cardiovascular: negative  Gastrointestinal: negative  Musculoskeletal: negative  Genitourinary: negative except for what is in HPI  Skin: negative   Neurological: negative  Hematological/Lymphatic: negative  Psychological: negative    Physical Exam:      Vitals:    02/07/24 1501   BP: 138/82     Patient is a 28 y.o. male in no acute distress and alert and oriented to person, place and time.  NAD, A/o  Non labored respiration  Normal peripheral pulses  Skin- warm and dry  Psych- normal mood and affect      Assessment and Plan      1. Malignant neoplasm of testicle, unspecified laterality, unspecified whether descended or undescended ([facility])    2. Lung nodule    3. Seminoma of descended right testis ([facility])                 Plan:      No follow-ups on file.  T2. CT scan today is negative.  CT negative today  Check tumor markers today.  See back in 6-8 months w repeat tumor markers and CT.             Dr. Danyel Elizalde MD

## 2024-02-08 LAB — AFP SERPL-MCNC: 2.6 UG/L

## 2024-02-09 LAB
HIV-1 RNA BY PCR, QN: NOT DETECTED
SOURCE: NORMAL

## 2024-02-10 LAB
B-HCG SERPL-ACNC: < 1 IU/L (ref 0–3)
SPEC CONTAINER SPEC: NORMAL
SPECIMEN SOURCE: NORMAL
T VAGINALIS RRNA SPEC QL NAA+PROBE: NEGATIVE

## 2024-02-26 ENCOUNTER — OFFICE VISIT (OUTPATIENT)
Dept: FAMILY MEDICINE CLINIC | Age: 29
End: 2024-02-26
Payer: COMMERCIAL

## 2024-02-26 VITALS
RESPIRATION RATE: 20 BRPM | SYSTOLIC BLOOD PRESSURE: 138 MMHG | BODY MASS INDEX: 25.31 KG/M2 | WEIGHT: 191 LBS | TEMPERATURE: 98 F | HEART RATE: 70 BPM | HEIGHT: 73 IN | DIASTOLIC BLOOD PRESSURE: 86 MMHG | OXYGEN SATURATION: 99 %

## 2024-02-26 DIAGNOSIS — L74.510 AXILLARY HYPERHIDROSIS: Primary | ICD-10-CM

## 2024-02-26 PROCEDURE — 99213 OFFICE O/P EST LOW 20 MIN: CPT | Performed by: STUDENT IN AN ORGANIZED HEALTH CARE EDUCATION/TRAINING PROGRAM

## 2024-02-26 SDOH — ECONOMIC STABILITY: FOOD INSECURITY: WITHIN THE PAST 12 MONTHS, YOU WORRIED THAT YOUR FOOD WOULD RUN OUT BEFORE YOU GOT MONEY TO BUY MORE.: NEVER TRUE

## 2024-02-26 SDOH — ECONOMIC STABILITY: INCOME INSECURITY: HOW HARD IS IT FOR YOU TO PAY FOR THE VERY BASICS LIKE FOOD, HOUSING, MEDICAL CARE, AND HEATING?: NOT HARD AT ALL

## 2024-02-26 SDOH — ECONOMIC STABILITY: FOOD INSECURITY: WITHIN THE PAST 12 MONTHS, THE FOOD YOU BOUGHT JUST DIDN'T LAST AND YOU DIDN'T HAVE MONEY TO GET MORE.: NEVER TRUE

## 2024-02-26 ASSESSMENT — PATIENT HEALTH QUESTIONNAIRE - PHQ9
SUM OF ALL RESPONSES TO PHQ QUESTIONS 1-9: 0
2. FEELING DOWN, DEPRESSED OR HOPELESS: 0
1. LITTLE INTEREST OR PLEASURE IN DOING THINGS: 0
SUM OF ALL RESPONSES TO PHQ QUESTIONS 1-9: 0
SUM OF ALL RESPONSES TO PHQ9 QUESTIONS 1 & 2: 0
SUM OF ALL RESPONSES TO PHQ QUESTIONS 1-9: 0
SUM OF ALL RESPONSES TO PHQ QUESTIONS 1-9: 0

## 2024-02-26 ASSESSMENT — ENCOUNTER SYMPTOMS: SHORTNESS OF BREATH: 0

## 2024-02-26 NOTE — PROGRESS NOTES
[facility]ESSIONAL [facility] PRACTICE  [address]  [place] [id number]  Dept: [phone]  Loc: [phone]      Carlos Wilson (:  1995) is a 28 y.o. male,Established patient, here for evaluation of the following chief complaint(s):  Other (Sweaty armpits)      ASSESSMENT/PLAN:  1. Axillary hyperhidrosis  -     aluminum chloride (DRYSOL) 20 % external solution; Apply topically nightly., Disp-60 mL, R-1, Normal    Trial of aluminum chloride nightly.  Patient to follow-up in 3-4 months or sooner if no improvement or worsening of symptoms.    Blood pressure noted to be slightly elevated/higher than we would like.  Patient to improve diet and exercise, also recommend patient to stop vaping.  Smoking counseling provided.    Return in about 3 months (around 2024) for well adult.    SUBJECTIVE/OBJECTIVE:  HPI  Patient has been complaining of excessive sweating of arm pain since and puberty and is finally tired symptoms and ready to do something about it.  Described as constant dripping.  Has tried multiple over-the-counter options including deodorant powders, gels and sprays.    No improvement from any of these treatment options.  Patient does does note some improvement with physical activity.  No other symptoms throughout the body including hands or feet.  Not related to the weather or temperature.  Sweating is limited to the armpits bilaterally and does not involve the hands feet neck or groin or any other part of his body that he has noted    Review of Systems  Constitutional: Negative for chills, diaphoresis and fever.   HENT: Negative for congestion and sore throat.    Eyes: Negative for redness and visual disturbance.   Respiratory: Negative for cough, chest tightness and shortness of breath.    Cardiovascular: Negative for palpitations and leg swelling.   Gastrointestinal: Negative for abdominal distention, abdominal pain and nausea. 
Attending Physician Note    I reviewed the patient's medical history, the resident's findings on physical examination, the patient's diagnoses, and treatment plan as documented in the resident note.  I concur with the treatment plan as documented.  Any additional suggestions noted below.    GE modifier    Brief summary:   Axillary hyperhidrosis - trial topical aluminum chloride.    BP above goal - continue to follow.  Rec smoking cessation.  Consider checking TSH, UA and may need prescription treatment if BP remains elevated.  
Pulmonary effort is normal.      Breath sounds: Normal breath sounds. No wheezing, rhonchi or rales.   Skin:     General: Skin is warm.      Comments: Damp axillae bilaterally, no erythema or irritation, no lymphadenopathy   Neurological:      Mental Status: He is alert.   Psychiatric:         Mood and Affect: Mood normal.         Behavior: Behavior normal.         Thought Content: Thought content normal.         Judgment: Judgment normal.                  An electronic signature was used to authenticate this note.    --Rachel Kumar

## 2024-09-25 ENCOUNTER — HOSPITAL ENCOUNTER (OUTPATIENT)
Dept: CT IMAGING | Age: 29
Discharge: HOME | End: 2024-09-25
Attending: UROLOGY
Payer: COMMERCIAL

## 2024-09-25 DIAGNOSIS — C62.90 MALIGNANT NEOPLASM OF TESTICLE, UNSPECIFIED LATERALITY, UNSPECIFIED WHETHER DESCENDED OR UNDESCENDED (HCC): ICD-10-CM

## 2024-09-25 DIAGNOSIS — R91.1 LUNG NODULE: ICD-10-CM

## 2024-09-25 PROCEDURE — 71260 CT THORAX DX C+: CPT

## 2024-09-25 PROCEDURE — 6360000004 HC RX CONTRAST MEDICATION: Performed by: UROLOGY

## 2024-09-25 PROCEDURE — 74177 CT ABD & PELVIS W/CONTRAST: CPT

## 2024-09-25 RX ORDER — IOPAMIDOL 755 MG/ML
125 INJECTION, SOLUTION INTRAVASCULAR
Status: COMPLETED | OUTPATIENT
Start: 2024-09-25 | End: 2024-09-25

## 2024-09-25 RX ADMIN — IOPAMIDOL 85 ML: 755 INJECTION, SOLUTION INTRAVENOUS at 08:14

## 2024-10-02 ENCOUNTER — OFFICE VISIT (OUTPATIENT)
Dept: UROLOGY | Age: 29
End: 2024-10-02
Payer: COMMERCIAL

## 2024-10-02 VITALS — BODY MASS INDEX: 28.39 KG/M2 | HEIGHT: 73 IN | WEIGHT: 214.2 LBS | RESPIRATION RATE: 14 BRPM

## 2024-10-02 DIAGNOSIS — R91.1 LUNG NODULE: ICD-10-CM

## 2024-10-02 DIAGNOSIS — C62.90 MALIGNANT NEOPLASM OF TESTICLE, UNSPECIFIED LATERALITY, UNSPECIFIED WHETHER DESCENDED OR UNDESCENDED (HCC): Primary | ICD-10-CM

## 2024-10-02 DIAGNOSIS — E04.1 THYROID NODULE: ICD-10-CM

## 2024-10-02 DIAGNOSIS — C62.11 SEMINOMA OF DESCENDED RIGHT TESTIS (HCC): ICD-10-CM

## 2024-10-02 PROCEDURE — 99214 OFFICE O/P EST MOD 30 MIN: CPT | Performed by: UROLOGY

## 2024-10-02 NOTE — PROGRESS NOTES
Dr. Danyel Elizalde MD  [facility]  Urology Clinic      Patient:  Carlos Wilson  YOB: 1995  Date: 10/2/2024    HISTORY OF PRESENT ILLNESS:   The patient is a 29 y.o. 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 Jan 2023 CT chest abd a pelvis negative. CT Sept 2024 of chest abd and pelvis also negative. Patient did not have tumor markers checked again!     Overall the problem(s) : show no change.  Associated Symptoms: No dysuria, gross hematuria.  Pain Severity: 0/10    Summary of old records:   T2 seminoma on orchiectomy. Orch was Feb 2022.   Aug 2022 CT scan was negative.   Jan 2023 tumor markers negative.   Feb 2024 CT negative.   Sept 2024 CT negative.     Imaging/Labs reviewed during today's visit:  I have independently reviewed and verified the following films during today's visit.  CT reviewed. Negative.     Last several PSA's:  No results found for: \"PSA\"    Last total testosterone:  No results found for: \"TESTOSTERONE\"    Urinalysis today:  No results found for this visit on 10/02/24.    Last BUN and creatinine:  Lab Results   Component Value Date    BUN 10 02/21/2022     Lab Results   Component Value Date    CREATININE 0.8 02/21/2022       Imaging Reviewed during this Office Visit:   (results were independently reviewed by physician and radiology report verified)    PAST MEDICAL, FAMILY AND SOCIAL HISTORY UPDATE:  Past Medical History:   Diagnosis Date    Nicotine dependence     Testicular cancer (HCC)      Past Surgical History:   Procedure Laterality Date    TESTICLE REMOVAL Right 2/23/2022    RIGHT RADICAL INGUINAL ORCHIECTOMY performed by Danyel Elizalde MD at [facility] OR     Family History   Problem Relation Age of Onset    Cancer Paternal Grandfather         lung    Hypertension Neg Hx     Colon Cancer Neg Hx     Prostate Cancer Neg Hx      No outpatient medications have been marked as taking for the 10/2/24 encounter (Office

## 2025-01-31 ENCOUNTER — HOSPITAL ENCOUNTER (OUTPATIENT)
Dept: ULTRASOUND IMAGING | Age: 30
Discharge: HOME | End: 2025-01-31
Attending: UROLOGY
Payer: COMMERCIAL

## 2025-01-31 DIAGNOSIS — E04.1 THYROID NODULE: ICD-10-CM

## 2025-01-31 PROCEDURE — 76536 US EXAM OF HEAD AND NECK: CPT

## 2025-02-06 ENCOUNTER — HOSPITAL ENCOUNTER (OUTPATIENT)
Dept: CT IMAGING | Age: 30
Discharge: HOME | End: 2025-02-06
Attending: UROLOGY
Payer: COMMERCIAL

## 2025-02-06 DIAGNOSIS — C62.90 MALIGNANT NEOPLASM OF TESTICLE, UNSPECIFIED LATERALITY, UNSPECIFIED WHETHER DESCENDED OR UNDESCENDED (HCC): ICD-10-CM

## 2025-02-06 PROCEDURE — 74177 CT ABD & PELVIS W/CONTRAST: CPT

## 2025-02-06 PROCEDURE — 6360000004 HC RX CONTRAST MEDICATION: Performed by: UROLOGY

## 2025-02-06 RX ORDER — IOPAMIDOL 755 MG/ML
80 INJECTION, SOLUTION INTRAVASCULAR
Status: COMPLETED | OUTPATIENT
Start: 2025-02-06 | End: 2025-02-06

## 2025-02-06 RX ADMIN — IOPAMIDOL 80 ML: 755 INJECTION, SOLUTION INTRAVENOUS at 07:48

## 2025-04-02 ENCOUNTER — OFFICE VISIT (OUTPATIENT)
Dept: UROLOGY | Age: 30
End: 2025-04-02
Payer: COMMERCIAL

## 2025-04-02 VITALS — WEIGHT: 210 LBS | BODY MASS INDEX: 27.83 KG/M2 | HEIGHT: 73 IN | RESPIRATION RATE: 18 BRPM

## 2025-04-02 DIAGNOSIS — C62.90 MALIGNANT NEOPLASM OF TESTICLE, UNSPECIFIED LATERALITY, UNSPECIFIED WHETHER DESCENDED OR UNDESCENDED (HCC): Primary | ICD-10-CM

## 2025-04-02 DIAGNOSIS — C62.11 SEMINOMA OF DESCENDED RIGHT TESTIS (HCC): ICD-10-CM

## 2025-04-02 PROCEDURE — 99214 OFFICE O/P EST MOD 30 MIN: CPT | Performed by: UROLOGY

## 2025-04-02 NOTE — PROGRESS NOTES
Dr. Danyel Elizalde MD  [facility]  Urology Clinic      Patient:  Carlos Wilson  YOB: 1995  Date: 4/2/2025    HISTORY OF PRESENT ILLNESS:   The patient is a 29 y.o. 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 Jan 2023 CT chest abd a pelvis negative. CT Sept 2024 of chest abd and pelvis also negative. Patient did not have tumor markers checked again!     Overall the problem(s) : show no change.  Associated Symptoms: No dysuria, gross hematuria.  Pain Severity: 0/10    Summary of old records:   T2 seminoma on orchiectomy. Orch was Feb 2022.   Aug 2022 CT scan was negative.   Jan 2023 tumor markers negative.   Feb 2024 CT negative.   Sept 2024 CT negative.     Imaging/Labs reviewed during today's visit:  I have independently reviewed and verified the following films during today's visit.  CT reviewed. Negative.     Last several PSA's:  No results found for: \"PSA\"    Last total testosterone:  No results found for: \"TESTOSTERONE\"    Urinalysis today:  No results found for this visit on 04/02/25.    Last BUN and creatinine:  Lab Results   Component Value Date    BUN 10 02/21/2022     Lab Results   Component Value Date    CREATININE 0.8 02/21/2022       Imaging Reviewed during this Office Visit:   (results were independently reviewed by physician and radiology report verified)    PAST MEDICAL, FAMILY AND SOCIAL HISTORY UPDATE:  Past Medical History:   Diagnosis Date    Nicotine dependence     Testicular cancer (HCC)      Past Surgical History:   Procedure Laterality Date    TESTICLE REMOVAL Right 2/23/2022    RIGHT RADICAL INGUINAL ORCHIECTOMY performed by Danyel Elizalde MD at [facility] OR     Family History   Problem Relation Age of Onset    Cancer Paternal Grandfather         lung    Hypertension Neg Hx     Colon Cancer Neg Hx     Prostate Cancer Neg Hx      No outpatient medications have been marked as taking for the 4/2/25 encounter (Office

## (undated) DEVICE — PENROSE DRAIN 18 X .5" SILICONE: Brand: MEDLINE

## (undated) DEVICE — SYRINGE IRRIG 60ML SFT PLIABLE BLB EZ TO GRP 1 HND USE W/

## (undated) DEVICE — PADDING CAST W6INXL4YD COT LO LINTING WYTEX

## (undated) DEVICE — Z DISCONTINUED USE 2275686 GLOVE SURG SZ 8 L12IN FNGR THK13MIL WHT ISOLEX POLYISOPRENE

## (undated) DEVICE — SUTURE PERMAHAND SZ 0 L18IN NONABSORBABLE BLK SILK BRAID A186H

## (undated) DEVICE — HYPODERMIC SAFETY NEEDLE: Brand: MAGELLAN

## (undated) DEVICE — SUTURE VCRL SZ 1 L36IN ABSRB UD L36MM CT-1 1/2 CIR J947H

## (undated) DEVICE — SUTURE VCRL + SZ 3-0 L27IN ABSRB UD L26MM SH 1/2 CIR VCP416H

## (undated) DEVICE — SUTURE PERMAHAND SZ 3-0 L30IN NONABSORBABLE BLK SH L26MM K832H

## (undated) DEVICE — SHEET, T, LAPAROTOMY, STERILE: Brand: MEDLINE

## (undated) DEVICE — SUTURE PERMAHAND SZ 0 L30IN NONABSORBABLE BLK L26MM SH 1/2 K834H

## (undated) DEVICE — SPONGE,PEANUT,XRAY,ST,SM,3/8",5/CARD: Brand: MEDLINE INDUSTRIES, INC.

## (undated) DEVICE — SYRINGE MED 10ML LUERLOCK TIP W/O SFTY DISP

## (undated) DEVICE — YANKAUER,FLEXIBLE HANDLE,REGLR CAPACITY: Brand: MEDLINE INDUSTRIES, INC.

## (undated) DEVICE — SUTURE PERMA-HAND SZ 2-0 L30IN NONABSORBABLE BLK L26MM SH K833H

## (undated) DEVICE — SOLUTION IV IRRIG POUR BRL 0.9% SODIUM CHL 2F7124

## (undated) DEVICE — ELECTRODE PT RET AD L9FT HI MOIST COND ADH HYDRGEL CORDED